# Patient Record
Sex: FEMALE | Race: BLACK OR AFRICAN AMERICAN | NOT HISPANIC OR LATINO | Employment: UNEMPLOYED | URBAN - NONMETROPOLITAN AREA
[De-identification: names, ages, dates, MRNs, and addresses within clinical notes are randomized per-mention and may not be internally consistent; named-entity substitution may affect disease eponyms.]

---

## 2020-12-08 ENCOUNTER — HOSPITAL ENCOUNTER (EMERGENCY)
Facility: HOSPITAL | Age: 40
Discharge: HOME | End: 2020-12-08
Attending: EMERGENCY MEDICINE
Payer: MEDICAID

## 2020-12-08 VITALS
BODY MASS INDEX: 29.03 KG/M2 | HEIGHT: 67 IN | HEART RATE: 94 BPM | WEIGHT: 185 LBS | RESPIRATION RATE: 18 BRPM | TEMPERATURE: 98 F | DIASTOLIC BLOOD PRESSURE: 72 MMHG | OXYGEN SATURATION: 100 % | SYSTOLIC BLOOD PRESSURE: 118 MMHG
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
## 2020-12-08 DIAGNOSIS — T14.90XA INJURY: ICD-10-CM

## 2020-12-08 DIAGNOSIS — S93.602A FOOT SPRAIN, LEFT, INITIAL ENCOUNTER: Primary | ICD-10-CM

## 2020-12-08 PROCEDURE — 25000003 PHARM REV CODE 250: Performed by: NURSE PRACTITIONER

## 2020-12-08 PROCEDURE — 99283 EMERGENCY DEPT VISIT LOW MDM: CPT | Mod: 25

## 2020-12-08 RX ORDER — NAPROXEN 500 MG/1
500 TABLET ORAL 2 TIMES DAILY WITH MEALS
Qty: 10 TABLET | Refills: 0 | Status: SHIPPED | OUTPATIENT
Start: 2020-12-08 | End: 2020-12-13

## 2020-12-08 RX ORDER — PROMETHAZINE HYDROCHLORIDE 25 MG/1
25 TABLET ORAL
Status: COMPLETED | OUTPATIENT
Start: 2020-12-08 | End: 2020-12-08

## 2020-12-08 RX ORDER — HYDROCODONE BITARTRATE AND ACETAMINOPHEN 5; 325 MG/1; MG/1
1 TABLET ORAL
Status: COMPLETED | OUTPATIENT
Start: 2020-12-08 | End: 2020-12-08

## 2020-12-08 RX ADMIN — PROMETHAZINE HYDROCHLORIDE 25 MG: 25 TABLET ORAL at 10:12

## 2020-12-08 RX ADMIN — HYDROCODONE BITARTRATE AND ACETAMINOPHEN 1 TABLET: 5; 325 TABLET ORAL at 10:12

## 2020-12-08 NOTE — ED PROVIDER NOTES
Encounter Date: 12/8/2020       History     Chief Complaint   Patient presents with    Fall     Pt states slipped on mountain dew bottle @ [facility] this morning.  Pt c/o pain to left foot radiating up leg and lower back.     This is a 40-year-old black female with noncontributory past medical history who presents emergency department with complaints of left foot pain after sustaining a fall at [facility] just prior to arrival.  The patient states that she accidentally slipped on a 20 oz mountain dew bottle at [facility] causing her to fall to her left side.  Patient reports constant sharp pain to the left lateral foot area that is worse with ambulation and movement.  Patient also reports mild pain to the bilateral upper and lower back area.  She denies loss of consciousness, head injury, neck pain, loss of control of bladder or bowel, or any other injuries or symptoms at this time.  She denies previous injury to left foot or back.        Review of patient's allergies indicates:  No Known Allergies  History reviewed. No pertinent past medical history.  Past Surgical History:   Procedure Laterality Date    TUBAL LIGATION       History reviewed. No pertinent family history.  Social History     Tobacco Use    Smoking status: Never Smoker   Substance Use Topics    Alcohol use: Never     Frequency: Never    Drug use: Not on file     Review of Systems   Constitutional: Negative.    Respiratory: Negative.    Cardiovascular: Negative.    Genitourinary: Negative.    Musculoskeletal: Positive for arthralgias (Left lateral foot pain), back pain ( upper and lower back) and gait problem ( painful). Negative for neck pain and neck stiffness.   Skin: Negative.    Neurological: Negative for dizziness, tremors, syncope, speech difficulty, weakness, light-headedness, numbness and headaches.   Hematological: Negative.        Physical Exam     Initial Vitals [12/08/20 1036]   BP Pulse Resp Temp SpO2   128/74 93 20 98.1 °F (36.7 °C) 100  %      MAP       --         Physical Exam    Nursing note and vitals reviewed.  Constitutional: She appears well-developed and well-nourished. No distress.   HENT:   Head: Normocephalic and atraumatic.   Mouth/Throat: Oropharynx is clear and moist.   Eyes: Conjunctivae and EOM are normal. Pupils are equal, round, and reactive to light.   Neck: Normal range of motion. Neck supple.   No central vertebral tenderness or pain noted on range of motion   Cardiovascular: Normal rate, regular rhythm, normal heart sounds and intact distal pulses.   Pulmonary/Chest: Breath sounds normal. No respiratory distress.   Musculoskeletal: Normal range of motion. Tenderness present. No edema.      Comments: Back appears normal upon inspection, no rash or bruising noted.  The patient experiences mild tenderness with palpation to Rosendo E thoracic and Rosendo lumbar regions bilaterally, however does not experience pain with palpation of central vertebral prominences.  The left foot appears normal upon inspection, no evidence of ecchymosis or swelling noted, however the patient does experience moderate tenderness with palpation of the region of the 4th and 5th metatarsals.  The left ankle appears normal and is nontender to palpation.   Neurological: She is alert and oriented to person, place, and time. She has normal strength. GCS score is 15. GCS eye subscore is 4. GCS verbal subscore is 5. GCS motor subscore is 6.   Skin: Skin is warm and dry. Capillary refill takes less than 2 seconds.   Psychiatric: She has a normal mood and affect. Thought content normal.         ED Course   Procedures  Labs Reviewed - No data to display       Imaging Results          X-Ray Foot Complete Left (Final result)  Result time 12/08/20 11:25:03    Final result by Isabella Hudson MD (12/08/20 11:25:03)                 Impression:      Negative      Electronically signed by: Isabella Hudson MD  Date:    12/08/2020  Time:    11:25             Narrative:     EXAMINATION:  XR FOOT COMPLETE 3 VIEW LEFT    CLINICAL HISTORY:  Injury, unspecified, initial encounter    FINDINGS:  No evidence of a fracture or dislocation.                                 Medical Decision Making:   Differential Diagnosis:   Left foot sprain  Left foot fracture    Low back strain  Lumbar fracture                   ED Course as of Dec 08 1215   Tue Dec 08, 2020   1211 No fracture noted on foot x-ray    [CB]      ED Course User Index  [CB] Debra Alvarado NP            Clinical Impression:       ICD-10-CM ICD-9-CM   1. Foot sprain, left, initial encounter  S93.602A 845.10   2. Injury  T14.90XA 959.9   3. Fall, initial encounter  W19.XXXA E888.9                          ED Disposition Condition    Discharge Stable        ED Prescriptions     Medication Sig Dispense Start Date End Date Auth. Provider    naproxen (NAPROSYN) 500 MG tablet Take 1 tablet (500 mg total) by mouth 2 (two) times daily with meals. for 5 days 10 tablet 12/8/2020 12/13/2020 Debra Alvarado NP        Follow-up Information     Follow up With Specialties Details Why Contact Info    PCP Follow UP  Call in 2 days for follow-up, for re-evaluation of today's complaint                                        Debra Alvarado NP  12/08/20 1216

## 2020-12-08 NOTE — DISCHARGE INSTRUCTIONS
It is important that you rest your left foot and apply ice to help augment pain relief.  You may also take Tylenol in addition to prescribed medication to help with pain relief.  You should follow-up with your primary doctor or an orthopedist if pain continues or worsens.

## 2021-01-06 ENCOUNTER — HOSPITAL ENCOUNTER (EMERGENCY)
Facility: HOSPITAL | Age: 41
Discharge: HOME | End: 2021-01-06
Attending: EMERGENCY MEDICINE
Payer: MEDICAID

## 2021-01-06 VITALS
TEMPERATURE: 99 F | OXYGEN SATURATION: 100 % | HEIGHT: 67 IN | BODY MASS INDEX: 28.91 KG/M2 | HEART RATE: 81 BPM | WEIGHT: 184.19 LBS | SYSTOLIC BLOOD PRESSURE: 127 MMHG | DIASTOLIC BLOOD PRESSURE: 79 MMHG | RESPIRATION RATE: 18 BRPM

## 2021-01-06 DIAGNOSIS — Z20.822 COVID-19 VIRUS NOT DETECTED: Primary | ICD-10-CM

## 2021-01-06 LAB
CTP QC/QA: YES
SARS-COV-2 RDRP RESP QL NAA+PROBE: NEGATIVE

## 2021-01-06 PROCEDURE — 99283 EMERGENCY DEPT VISIT LOW MDM: CPT

## 2021-01-06 PROCEDURE — U0002 COVID-19 LAB TEST NON-CDC: HCPCS | Performed by: EMERGENCY MEDICINE

## 2021-04-28 ENCOUNTER — HOSPITAL ENCOUNTER (EMERGENCY)
Facility: HOSPITAL | Age: 41
Discharge: HOME | End: 2021-04-28
Attending: EMERGENCY MEDICINE
Payer: MEDICAID

## 2021-04-28 VITALS
HEART RATE: 78 BPM | TEMPERATURE: 99 F | DIASTOLIC BLOOD PRESSURE: 95 MMHG | BODY MASS INDEX: 27.94 KG/M2 | RESPIRATION RATE: 18 BRPM | HEIGHT: 67 IN | SYSTOLIC BLOOD PRESSURE: 141 MMHG | OXYGEN SATURATION: 99 % | WEIGHT: 178 LBS

## 2021-04-28 DIAGNOSIS — R19.7 NAUSEA, VOMITING, AND DIARRHEA: ICD-10-CM

## 2021-04-28 DIAGNOSIS — R53.1 WEAKNESS: Primary | ICD-10-CM

## 2021-04-28 DIAGNOSIS — R11.2 NAUSEA, VOMITING, AND DIARRHEA: ICD-10-CM

## 2021-04-28 LAB
ALBUMIN SERPL BCP-MCNC: 4.5 G/DL (ref 3.5–5.2)
ALP SERPL-CCNC: 80 U/L (ref 55–135)
ALT SERPL W/O P-5'-P-CCNC: 13 U/L (ref 10–44)
ANION GAP SERPL CALC-SCNC: 6 MMOL/L (ref 8–16)
AST SERPL-CCNC: 14 U/L (ref 10–40)
B-HCG UR QL: NEGATIVE
BACTERIA #/AREA URNS HPF: NEGATIVE /HPF
BASOPHILS # BLD AUTO: 0.05 K/UL (ref 0–0.2)
BASOPHILS NFR BLD: 0.5 % (ref 0–1.9)
BILIRUB SERPL-MCNC: 0.5 MG/DL (ref 0.1–1)
BILIRUB UR QL STRIP: NEGATIVE
BUN SERPL-MCNC: 10 MG/DL (ref 6–20)
CALCIUM SERPL-MCNC: 9.7 MG/DL (ref 8.7–10.5)
CHLORIDE SERPL-SCNC: 109 MMOL/L (ref 95–110)
CLARITY UR: ABNORMAL
CO2 SERPL-SCNC: 26 MMOL/L (ref 23–29)
COLOR UR: YELLOW
CREAT SERPL-MCNC: 1.1 MG/DL (ref 0.5–1.4)
DIFFERENTIAL METHOD: ABNORMAL
EOSINOPHIL # BLD AUTO: 0 K/UL (ref 0–0.5)
EOSINOPHIL NFR BLD: 0.4 % (ref 0–8)
ERYTHROCYTE [DISTWIDTH] IN BLOOD BY AUTOMATED COUNT: 17.8 % (ref 11.5–14.5)
EST. GFR  (AFRICAN AMERICAN): >60 ML/MIN/1.73 M^2
EST. GFR  (NON AFRICAN AMERICAN): >60 ML/MIN/1.73 M^2
GLUCOSE SERPL-MCNC: 113 MG/DL (ref 70–110)
GLUCOSE UR QL STRIP: NEGATIVE
HCT VFR BLD AUTO: 39.6 % (ref 37–48.5)
HGB BLD-MCNC: 13 G/DL (ref 12–16)
HGB UR QL STRIP: ABNORMAL
HYALINE CASTS #/AREA URNS LPF: 5 /LPF
IMM GRANULOCYTES # BLD AUTO: 0.03 K/UL (ref 0–0.04)
IMM GRANULOCYTES NFR BLD AUTO: 0.3 % (ref 0–0.5)
KETONES UR QL STRIP: ABNORMAL
LEUKOCYTE ESTERASE UR QL STRIP: NEGATIVE
LYMPHOCYTES # BLD AUTO: 2.2 K/UL (ref 1–4.8)
LYMPHOCYTES NFR BLD: 22.4 % (ref 18–48)
MCH RBC QN AUTO: 26.1 PG (ref 27–31)
MCHC RBC AUTO-ENTMCNC: 32.8 G/DL (ref 32–36)
MCV RBC AUTO: 79 FL (ref 82–98)
MICROSCOPIC COMMENT: ABNORMAL
MONOCYTES # BLD AUTO: 0.6 K/UL (ref 0.3–1)
MONOCYTES NFR BLD: 6.1 % (ref 4–15)
NEUTROPHILS # BLD AUTO: 7 K/UL (ref 1.8–7.7)
NEUTROPHILS NFR BLD: 70.3 % (ref 38–73)
NITRITE UR QL STRIP: NEGATIVE
NRBC BLD-RTO: 0 /100 WBC
PH UR STRIP: 6 [PH] (ref 5–8)
PLATELET # BLD AUTO: 311 K/UL (ref 150–450)
PMV BLD AUTO: 10.5 FL (ref 9.2–12.9)
POTASSIUM SERPL-SCNC: 3.6 MMOL/L (ref 3.5–5.1)
PROT SERPL-MCNC: 9.1 G/DL (ref 6–8.4)
PROT UR QL STRIP: ABNORMAL
RBC # BLD AUTO: 4.99 M/UL (ref 4–5.4)
RBC #/AREA URNS HPF: 8 /HPF (ref 0–4)
SODIUM SERPL-SCNC: 141 MMOL/L (ref 136–145)
SP GR UR STRIP: 1.02 (ref 1–1.03)
SQUAMOUS #/AREA URNS HPF: 8 /HPF
URN SPEC COLLECT METH UR: ABNORMAL
UROBILINOGEN UR STRIP-ACNC: 1 EU/DL
WBC # BLD AUTO: 9.98 K/UL (ref 3.9–12.7)
WBC #/AREA URNS HPF: 2 /HPF (ref 0–5)

## 2021-04-28 PROCEDURE — 81000 URINALYSIS NONAUTO W/SCOPE: CPT | Performed by: NURSE PRACTITIONER

## 2021-04-28 PROCEDURE — 85025 COMPLETE CBC W/AUTO DIFF WBC: CPT | Performed by: NURSE PRACTITIONER

## 2021-04-28 PROCEDURE — 80053 COMPREHEN METABOLIC PANEL: CPT | Performed by: NURSE PRACTITIONER

## 2021-04-28 PROCEDURE — 63600175 PHARM REV CODE 636 W HCPCS: Performed by: NURSE PRACTITIONER

## 2021-04-28 PROCEDURE — 36415 COLL VENOUS BLD VENIPUNCTURE: CPT | Performed by: NURSE PRACTITIONER

## 2021-04-28 PROCEDURE — 25000003 PHARM REV CODE 250: Performed by: NURSE PRACTITIONER

## 2021-04-28 PROCEDURE — 81025 URINE PREGNANCY TEST: CPT | Performed by: NURSE PRACTITIONER

## 2021-04-28 PROCEDURE — 96375 TX/PRO/DX INJ NEW DRUG ADDON: CPT

## 2021-04-28 PROCEDURE — 99284 EMERGENCY DEPT VISIT MOD MDM: CPT | Mod: 25

## 2021-04-28 PROCEDURE — 96365 THER/PROPH/DIAG IV INF INIT: CPT

## 2021-04-28 RX ORDER — PROMETHAZINE HYDROCHLORIDE 25 MG/1
25 TABLET ORAL EVERY 6 HOURS PRN
Qty: 15 TABLET | Refills: 0 | Status: SHIPPED | OUTPATIENT
Start: 2021-04-28 | End: 2021-12-22

## 2021-04-28 RX ORDER — DEXAMETHASONE SODIUM PHOSPHATE 4 MG/ML
4 INJECTION, SOLUTION INTRA-ARTICULAR; INTRALESIONAL; INTRAMUSCULAR; INTRAVENOUS; SOFT TISSUE
Status: COMPLETED | OUTPATIENT
Start: 2021-04-28 | End: 2021-04-28

## 2021-04-28 RX ADMIN — SODIUM CHLORIDE 1000 ML: 0.9 INJECTION, SOLUTION INTRAVENOUS at 10:04

## 2021-04-28 RX ADMIN — PROMETHAZINE HYDROCHLORIDE 25 MG: 25 INJECTION INTRAMUSCULAR; INTRAVENOUS at 10:04

## 2021-04-28 RX ADMIN — DEXAMETHASONE SODIUM PHOSPHATE 4 MG: 4 INJECTION INTRA-ARTICULAR; INTRALESIONAL; INTRAMUSCULAR; INTRAVENOUS; SOFT TISSUE at 11:04

## 2021-12-22 ENCOUNTER — OFFICE VISIT (OUTPATIENT)
Dept: OBSTETRICS AND GYNECOLOGY | Facility: CLINIC | Age: 41
End: 2021-12-22
Payer: MEDICAID

## 2021-12-22 VITALS
SYSTOLIC BLOOD PRESSURE: 135 MMHG | HEIGHT: 67 IN | DIASTOLIC BLOOD PRESSURE: 85 MMHG | HEART RATE: 98 BPM | WEIGHT: 188 LBS | BODY MASS INDEX: 29.51 KG/M2

## 2021-12-22 DIAGNOSIS — N92.1 MENORRHAGIA WITH IRREGULAR CYCLE: ICD-10-CM

## 2021-12-22 DIAGNOSIS — Z30.013 ENCOUNTER FOR INITIAL PRESCRIPTION OF INJECTABLE CONTRACEPTIVE: ICD-10-CM

## 2021-12-22 DIAGNOSIS — Z12.4 SCREENING FOR MALIGNANT NEOPLASM OF THE CERVIX: Primary | ICD-10-CM

## 2021-12-22 DIAGNOSIS — Z01.419 ENCOUNTER FOR ANNUAL ROUTINE GYNECOLOGICAL EXAMINATION: ICD-10-CM

## 2021-12-22 PROCEDURE — 99999 PR PBB SHADOW E&M-EST. PATIENT-LVL II: CPT | Mod: PBBFAC,,, | Performed by: OBSTETRICS & GYNECOLOGY

## 2021-12-22 PROCEDURE — 3079F DIAST BP 80-89 MM HG: CPT | Mod: CPTII,,, | Performed by: OBSTETRICS & GYNECOLOGY

## 2021-12-22 PROCEDURE — 99385 PR PREVENTIVE VISIT,NEW,18-39: ICD-10-PCS | Mod: S$PBB,,, | Performed by: OBSTETRICS & GYNECOLOGY

## 2021-12-22 PROCEDURE — 99385 PREV VISIT NEW AGE 18-39: CPT | Mod: S$PBB,,, | Performed by: OBSTETRICS & GYNECOLOGY

## 2021-12-22 PROCEDURE — 99999 PR PBB SHADOW E&M-EST. PATIENT-LVL II: ICD-10-PCS | Mod: PBBFAC,,, | Performed by: OBSTETRICS & GYNECOLOGY

## 2021-12-22 PROCEDURE — 3008F PR BODY MASS INDEX (BMI) DOCUMENTED: ICD-10-PCS | Mod: CPTII,,, | Performed by: OBSTETRICS & GYNECOLOGY

## 2021-12-22 PROCEDURE — 1159F PR MEDICATION LIST DOCUMENTED IN MEDICAL RECORD: ICD-10-PCS | Mod: CPTII,,, | Performed by: OBSTETRICS & GYNECOLOGY

## 2021-12-22 PROCEDURE — 1159F MED LIST DOCD IN RCRD: CPT | Mod: CPTII,,, | Performed by: OBSTETRICS & GYNECOLOGY

## 2021-12-22 PROCEDURE — 99212 OFFICE O/P EST SF 10 MIN: CPT | Mod: PBBFAC | Performed by: OBSTETRICS & GYNECOLOGY

## 2021-12-22 PROCEDURE — 3008F BODY MASS INDEX DOCD: CPT | Mod: CPTII,,, | Performed by: OBSTETRICS & GYNECOLOGY

## 2021-12-22 PROCEDURE — 3075F SYST BP GE 130 - 139MM HG: CPT | Mod: CPTII,,, | Performed by: OBSTETRICS & GYNECOLOGY

## 2021-12-22 PROCEDURE — 3079F PR MOST RECENT DIASTOLIC BLOOD PRESSURE 80-89 MM HG: ICD-10-PCS | Mod: CPTII,,, | Performed by: OBSTETRICS & GYNECOLOGY

## 2021-12-22 PROCEDURE — 3075F PR MOST RECENT SYSTOLIC BLOOD PRESS GE 130-139MM HG: ICD-10-PCS | Mod: CPTII,,, | Performed by: OBSTETRICS & GYNECOLOGY

## 2021-12-22 RX ORDER — NORELGESTROMIN AND ETHINYL ESTRADIOL 35; 150 UG/MG; UG/MG
PATCH TRANSDERMAL
Qty: 3 PATCH | Refills: 6 | OUTPATIENT
Start: 2021-12-22 | End: 2022-01-03

## 2021-12-28 ENCOUNTER — CLINICAL SUPPORT (OUTPATIENT)
Dept: OBSTETRICS AND GYNECOLOGY | Facility: CLINIC | Age: 41
End: 2021-12-28
Payer: MEDICAID

## 2021-12-28 DIAGNOSIS — Z32.02 NEGATIVE PREGNANCY TEST: ICD-10-CM

## 2021-12-28 DIAGNOSIS — Z30.42 ENCOUNTER FOR DEPO-PROVERA CONTRACEPTION: Primary | ICD-10-CM

## 2021-12-28 LAB
B-HCG UR QL: NEGATIVE
CTP QC/QA: YES

## 2021-12-28 PROCEDURE — 96372 THER/PROPH/DIAG INJ SC/IM: CPT | Mod: PBBFAC

## 2021-12-28 PROCEDURE — 81025 URINE PREGNANCY TEST: CPT | Mod: PBBFAC

## 2021-12-28 RX ORDER — MEDROXYPROGESTERONE ACETATE 150 MG/ML
150 INJECTION, SUSPENSION INTRAMUSCULAR
Status: COMPLETED | OUTPATIENT
Start: 2021-12-28 | End: 2021-12-28

## 2021-12-28 RX ADMIN — MEDROXYPROGESTERONE ACETATE 150 MG: 150 INJECTION, SUSPENSION, EXTENDED RELEASE INTRAMUSCULAR at 08:12

## 2022-01-03 ENCOUNTER — HOSPITAL ENCOUNTER (EMERGENCY)
Facility: HOSPITAL | Age: 42
Discharge: HOME | End: 2022-01-03
Attending: EMERGENCY MEDICINE
Payer: MEDICAID

## 2022-01-03 VITALS
TEMPERATURE: 100 F | OXYGEN SATURATION: 99 % | RESPIRATION RATE: 16 BRPM | DIASTOLIC BLOOD PRESSURE: 92 MMHG | HEIGHT: 67 IN | WEIGHT: 180 LBS | SYSTOLIC BLOOD PRESSURE: 138 MMHG | BODY MASS INDEX: 28.25 KG/M2 | HEART RATE: 91 BPM

## 2022-01-03 DIAGNOSIS — S61.511A WRIST LACERATION, RIGHT, INITIAL ENCOUNTER: Primary | ICD-10-CM

## 2022-01-03 DIAGNOSIS — S61.519A WRIST LACERATION: ICD-10-CM

## 2022-01-03 LAB
HPV16+18+H RISK 12 DNA CVX-IMP: NORMAL
LIQUID BASED PAP SMEAR, SCREENING: NORMAL

## 2022-01-03 PROCEDURE — 12001 RPR S/N/AX/GEN/TRNK 2.5CM/<: CPT

## 2022-01-03 PROCEDURE — 63600175 PHARM REV CODE 636 W HCPCS: Performed by: EMERGENCY MEDICINE

## 2022-01-03 PROCEDURE — 90471 IMMUNIZATION ADMIN: CPT | Performed by: EMERGENCY MEDICINE

## 2022-01-03 PROCEDURE — 99284 EMERGENCY DEPT VISIT MOD MDM: CPT | Mod: 25

## 2022-01-03 PROCEDURE — 25000003 PHARM REV CODE 250: Performed by: EMERGENCY MEDICINE

## 2022-01-03 PROCEDURE — 90715 TDAP VACCINE 7 YRS/> IM: CPT | Performed by: EMERGENCY MEDICINE

## 2022-01-03 RX ORDER — LIDOCAINE HYDROCHLORIDE 10 MG/ML
1 INJECTION INFILTRATION; PERINEURAL
Status: COMPLETED | OUTPATIENT
Start: 2022-01-03 | End: 2022-01-03

## 2022-01-03 RX ORDER — HYDROCODONE BITARTRATE AND ACETAMINOPHEN 5; 325 MG/1; MG/1
1 TABLET ORAL
Status: COMPLETED | OUTPATIENT
Start: 2022-01-03 | End: 2022-01-03

## 2022-01-03 RX ORDER — IBUPROFEN 600 MG/1
600 TABLET ORAL
Status: COMPLETED | OUTPATIENT
Start: 2022-01-03 | End: 2022-01-03

## 2022-01-03 RX ORDER — ONDANSETRON 4 MG/1
4 TABLET, ORALLY DISINTEGRATING ORAL
Status: COMPLETED | OUTPATIENT
Start: 2022-01-03 | End: 2022-01-03

## 2022-01-03 RX ADMIN — LIDOCAINE HYDROCHLORIDE 1 ML: 10 INJECTION, SOLUTION INFILTRATION; PERINEURAL at 04:01

## 2022-01-03 RX ADMIN — ONDANSETRON 4 MG: 4 TABLET, ORALLY DISINTEGRATING ORAL at 03:01

## 2022-01-03 RX ADMIN — IBUPROFEN 600 MG: 600 TABLET ORAL at 03:01

## 2022-01-03 RX ADMIN — TETANUS TOXOID, REDUCED DIPHTHERIA TOXOID AND ACELLULAR PERTUSSIS VACCINE, ADSORBED 0.5 ML: 5; 2.5; 8; 8; 2.5 SUSPENSION INTRAMUSCULAR at 03:01

## 2022-01-03 RX ADMIN — HYDROCODONE BITARTRATE AND ACETAMINOPHEN 1 TABLET: 5; 325 TABLET ORAL at 03:01

## 2022-01-03 NOTE — ED PROVIDER NOTES
Encounter Date: 1/3/2022       History     Chief Complaint   Patient presents with    Laceration     Pt presented to the ER with a 1/2 in laceration just proximal to her right thumb. Pt stated she was cutting bologna to go in her gumbo and she cut her wrist with a kitchen knife. Bleeding had stopped prior to ER arrival. Pt pain is 10/10 and she stated she has not had a tetanus shot.      Patient is a 41-year-old female no significant past medical history presenting to the emergency department complaining of a stab wound on the dorsal aspect of the right wrist about 2 in proximal to the 1st MCP.    Patient states she was cooking a combo this morning she was chopping her vegetables she accidentally stabbed her wrist as described above there is an approximately 3/4 to 1 cm laceration she has pain with movement of the right thumb.    Bleeding is controlled at this time.    She is complaining of pain with movement of the 1st MCP.    She denies fever chills nausea vomiting diarrhea constipation chest pain shortness of breath headache and blurry vision    Patient is left-hand dominant        Review of patient's allergies indicates:  No Known Allergies  Past Medical History:   Diagnosis Date    COVID-19      Past Surgical History:   Procedure Laterality Date    TUBAL LIGATION       No family history on file.  Social History     Tobacco Use    Smoking status: Never Smoker   Substance Use Topics    Alcohol use: Never     Review of Systems   Constitutional: Negative.    HENT: Negative.    Eyes: Negative.    Respiratory: Negative.    Cardiovascular: Negative.    Gastrointestinal: Negative.    Endocrine: Negative.    Genitourinary: Negative.    Musculoskeletal: Negative.    Skin: Negative.    Allergic/Immunologic: Negative.    Neurological: Negative.    Hematological: Negative.    Psychiatric/Behavioral: Negative.    All other systems reviewed and are negative.      Physical Exam     Initial Vitals [01/03/22 0254]   BP  Pulse Resp Temp SpO2   (!) 138/92 110 18 99.7 °F (37.6 °C) 98 %      MAP       --         Physical Exam    Vitals reviewed.  Constitutional: She appears well-developed and well-nourished. No distress.   HENT:   Head: Normocephalic and atraumatic.   Nose: Nose normal.   Mouth/Throat: Oropharynx is clear and moist.   Eyes: Conjunctivae and EOM are normal. Pupils are equal, round, and reactive to light.   Neck: Neck supple.   Normal range of motion.  Cardiovascular: Normal rate and regular rhythm.   Pulmonary/Chest: Breath sounds normal.   Abdominal: Abdomen is soft. Bowel sounds are normal.   Musculoskeletal:         General: Normal range of motion.      Cervical back: Normal range of motion and neck supple.      Comments: The lateral dorsal aspect of the right wrist approximately 2 in proximal to the 1st MCP there is a 1 cm linear laceration.    Intrinsic muscles of the hand and sensation along all nerve roots are intact.  Patient does have some minor limitation in range of motion at the MCP this is likely secondary pain.     Neurological: She is alert and oriented to person, place, and time. She has normal strength. GCS score is 15. GCS eye subscore is 4. GCS verbal subscore is 5. GCS motor subscore is 6.   Skin: Skin is warm and dry. Capillary refill takes less than 2 seconds.   Psychiatric: She has a normal mood and affect. Thought content normal.         ED Course   Lac Repair    Date/Time: 1/3/2022 4:15 AM  Performed by: Brown Flores MD  Authorized by: Brown Flores MD     Consent:     Consent obtained:  Verbal    Consent given by:  Patient    Risks, benefits, and alternatives were discussed: yes      Risks discussed:  Infection and pain  Universal protocol:     Patient identity confirmed:  Verbally with patient and arm band  Anesthesia:     Anesthesia method:  Local infiltration    Local anesthetic:  Lidocaine 1% w/o epi  Laceration details:     Location:  Hand    Hand location:  R wrist    Length  (cm):  1  Pre-procedure details:     Preparation:  Patient was prepped and draped in usual sterile fashion  Exploration:     Limited defect created (wound extended): no      Hemostasis achieved with:  Direct pressure    Imaging obtained: x-ray      Imaging outcome: foreign body not noted      Wound exploration: wound explored through full range of motion      Wound extent: no nerve damage noted, no tendon damage noted, no underlying fracture noted and no vascular damage noted      Contaminated: no    Treatment:     Area cleansed with:  Chlorhexidine and soap and water    Amount of cleaning:  Standard    Irrigation method:  Syringe    Visualized foreign bodies/material removed: no      Debridement:  None  Skin repair:     Repair method:  Sutures    Suture size:  4-0    Wound skin closure material used: Rapid Vicryl.    Suture technique:  Simple interrupted    Number of sutures:  2  Approximation:     Approximation:  Loose  Repair type:     Repair type:  Simple  Post-procedure details:     Dressing:  Adhesive bandage    Procedure completion:  Tolerated well, no immediate complications      Labs Reviewed - No data to display       Imaging Results          X-Ray Wrist Complete Right (In process)               X-Rays:   Independently Interpreted Readings:   Other Readings:  X-ray right wrist viewed by me no fracture no dislocation no foreign    Medications   Tdap (BOOSTRIX) vaccine injection 0.5 mL (0.5 mLs Intramuscular Given 1/3/22 0320)   HYDROcodone-acetaminophen 5-325 mg per tablet 1 tablet (1 tablet Oral Given 1/3/22 0319)   ondansetron disintegrating tablet 4 mg (4 mg Oral Given 1/3/22 0319)   ibuprofen tablet 600 mg (600 mg Oral Given 1/3/22 0320)   LIDOcaine HCL 10 mg/ml (1%) injection 1 mL (1 mL Other Given 1/3/22 0790)     Medical Decision Making:   Initial Assessment:   41-year-old female presenting to the emergency department complaining of a wound to her right wrist that she self-inflicted accidentally wall  could.  She said it was a slight jab stick injury she reports some tingling at the base of her thumb but she does have full range of motion of her thumb as well as the remainder of her hand no evidence of vascular injury no evidence of loss of sensation.    Wound cleaned by me patient requested sutures after we had a discussion about whether it is leave it open her not to small 4 0 rapid absorbable sutures were placed by me under sterile conditions patient tolerated very.  There was loose approximation as this was a stab.    Discussed with the patient wound care described her to keep the area clean and dry wash with soap and water 2 to 3 times a day use an over-the-counter antibiotic ointment and keep it covered whenever out and about.    Patient voiced understanding of discharge plan return precautions and the need for follow-up                      Clinical Impression:   Final diagnoses:  [S61.519A] Wrist laceration  [S61.511A] Wrist laceration, right, initial encounter (Primary)          ED Disposition Condition    Discharge Stable        ED Prescriptions     None        Follow-up Information     Follow up With Specialties Details Why Contact Info Additional Information    [facility] Emergency Department Emergency Medicine  As needed [address] [place] [id number]  [phone] Floor 1           Brown Flores MD  01/03/22 0434

## 2022-01-26 ENCOUNTER — OFFICE VISIT (OUTPATIENT)
Dept: OBSTETRICS AND GYNECOLOGY | Facility: CLINIC | Age: 42
End: 2022-01-26
Payer: MEDICAID

## 2022-01-26 VITALS
WEIGHT: 178 LBS | DIASTOLIC BLOOD PRESSURE: 91 MMHG | SYSTOLIC BLOOD PRESSURE: 145 MMHG | BODY MASS INDEX: 27.88 KG/M2 | HEART RATE: 93 BPM

## 2022-01-26 DIAGNOSIS — N92.1 MENORRHAGIA WITH IRREGULAR CYCLE: Primary | ICD-10-CM

## 2022-01-26 PROCEDURE — 99999 PR PBB SHADOW E&M-EST. PATIENT-LVL II: ICD-10-PCS | Mod: PBBFAC,,, | Performed by: OBSTETRICS & GYNECOLOGY

## 2022-01-26 PROCEDURE — 3008F PR BODY MASS INDEX (BMI) DOCUMENTED: ICD-10-PCS | Mod: CPTII,,, | Performed by: OBSTETRICS & GYNECOLOGY

## 2022-01-26 PROCEDURE — 99213 OFFICE O/P EST LOW 20 MIN: CPT | Mod: S$PBB,,, | Performed by: OBSTETRICS & GYNECOLOGY

## 2022-01-26 PROCEDURE — 99213 PR OFFICE/OUTPT VISIT, EST, LEVL III, 20-29 MIN: ICD-10-PCS | Mod: S$PBB,,, | Performed by: OBSTETRICS & GYNECOLOGY

## 2022-01-26 PROCEDURE — 99999 PR PBB SHADOW E&M-EST. PATIENT-LVL II: CPT | Mod: PBBFAC,,, | Performed by: OBSTETRICS & GYNECOLOGY

## 2022-01-26 PROCEDURE — 3077F SYST BP >= 140 MM HG: CPT | Mod: CPTII,,, | Performed by: OBSTETRICS & GYNECOLOGY

## 2022-01-26 PROCEDURE — 3080F DIAST BP >= 90 MM HG: CPT | Mod: CPTII,,, | Performed by: OBSTETRICS & GYNECOLOGY

## 2022-01-26 PROCEDURE — 99212 OFFICE O/P EST SF 10 MIN: CPT | Mod: PBBFAC | Performed by: OBSTETRICS & GYNECOLOGY

## 2022-01-26 PROCEDURE — 3077F PR MOST RECENT SYSTOLIC BLOOD PRESSURE >= 140 MM HG: ICD-10-PCS | Mod: CPTII,,, | Performed by: OBSTETRICS & GYNECOLOGY

## 2022-01-26 PROCEDURE — 3008F BODY MASS INDEX DOCD: CPT | Mod: CPTII,,, | Performed by: OBSTETRICS & GYNECOLOGY

## 2022-01-26 PROCEDURE — 3080F PR MOST RECENT DIASTOLIC BLOOD PRESSURE >= 90 MM HG: ICD-10-PCS | Mod: CPTII,,, | Performed by: OBSTETRICS & GYNECOLOGY

## 2022-01-26 NOTE — PROGRESS NOTES
Subjective:    Patient ID: Cliff Matute is a 41 y.o. y.o. female    Chief Complaint:   Chief Complaint   Patient presents with    Follow-up     Having heavy periods, and light spotting in between each. Also having lots of bleeding during sex. Would like to discuss surgery       History of Present Illness:  Cliff presents today for discussion of heavy cycles.  patient began Depo-Provera injections on 12/28/2021 and states that her cycles are still heavy and she wants more definitive management.  She states that she is having bleeding during sex and wants surgery to stop her cycles.  She states she used patches in the past and still had issues.      Review of Systems   Constitutional: Negative for chills, fatigue and fever.   Respiratory: Negative for shortness of breath.    Cardiovascular: Negative for chest pain.   Gastrointestinal: Negative for abdominal pain, constipation, diarrhea and nausea.   Genitourinary: Positive for menstrual problem and vaginal bleeding. Negative for bladder incontinence, dysuria, hot flashes and pelvic pain.   Neurological: Negative for headaches.   Psychiatric/Behavioral: Negative for depression.         Objective:    Vital Signs:  Vitals:    01/26/22 1056   BP: (!) 145/91   Pulse: 93     Wt Readings from Last 1 Encounters:   01/26/22 80.7 kg (178 lb)     Body mass index is 27.88 kg/m².    Physical Exam:  General:  alert, no distress    Exam deferred-discussion only     Discussed that she just recently took her 1st Depo-Provera shot and some of her bleeding could take some time to resolve.  Patient wants more definitive management now.  Options such as progesterone secreting IUD, endometrial ablation, and hysterectomy discussed.  Patient adamantly does not want hysterectomy.  All risks, benefits of each discussed with patient and she would like to have endometrial ablation.  I explained workup involved and she is agreeable to this.  All questions answered    I spent a total of 20  minutes on the day of the visit.  This includes face to face time and non-face to face time preparing to see the patient (eg, review of tests), obtaining and/or reviewing separately obtained history, documenting clinical information in the electronic or other health record, independently interpreting results and communicating results to the patient/family/caregiver, or care coordinator.          Assessment:      1. Menorrhagia with irregular cycle          Plan:      Menorrhagia with irregular cycle    Return to clinic for endometrial biopsy and ultrasound as part of workup for endometrial ablation       Tonia Tabor MD, FACOG   01/26/2022 11:14 AM

## 2022-02-10 ENCOUNTER — PROCEDURE VISIT (OUTPATIENT)
Dept: OBSTETRICS AND GYNECOLOGY | Facility: CLINIC | Age: 42
End: 2022-02-10
Payer: MEDICAID

## 2022-02-10 VITALS
WEIGHT: 181 LBS | DIASTOLIC BLOOD PRESSURE: 87 MMHG | BODY MASS INDEX: 28.35 KG/M2 | SYSTOLIC BLOOD PRESSURE: 135 MMHG | HEART RATE: 87 BPM

## 2022-02-10 DIAGNOSIS — N93.9 VAGINAL BLEEDING: Primary | ICD-10-CM

## 2022-02-10 DIAGNOSIS — N92.1 MENORRHAGIA WITH IRREGULAR CYCLE: Primary | ICD-10-CM

## 2022-02-10 PROCEDURE — 58100 BIOPSY (GYNECOLOGICAL): ICD-10-PCS | Mod: S$PBB,,, | Performed by: OBSTETRICS & GYNECOLOGY

## 2022-02-10 PROCEDURE — 81025 URINE PREGNANCY TEST: CPT | Mod: PBBFAC | Performed by: OBSTETRICS & GYNECOLOGY

## 2022-02-10 PROCEDURE — 58100 BIOPSY OF UTERUS LINING: CPT | Mod: PBBFAC | Performed by: OBSTETRICS & GYNECOLOGY

## 2022-02-10 NOTE — PROCEDURES
Biopsy (Gynecological)    Date/Time: 2/10/2022 11:15 AM  Performed by: Tonia Tabor MD  Authorized by: Tonia Tabor MD     Consent Done?:  Yes (Written)   Patient was prepped and draped in the normal sterile fashion.  Local anesthesia used?: No      Biopsy Location:  Uterus  Estimated blood loss (cc):  1   Patient tolerated the procedure well with no immediate complications.     Cervix swabbed with betadine.  Single-tooth tenaculum used for traction and placed on the anterior lip of the cervix.  Uterus sound to 11 cm an adequate tissue obtained with 1 pass.  Tenaculum site hemostatic with silver nitrate.

## 2022-02-15 DIAGNOSIS — N92.1 MENORRHAGIA WITH IRREGULAR CYCLE: Primary | ICD-10-CM

## 2022-02-15 LAB — TISSUE SPECIMEN TO PATHOLOGY, OB/GYN: NORMAL

## 2022-02-15 NOTE — PROGRESS NOTES
Please call patient regarding results.  Biopsy with no abnormalities--can schedule endometrial ablation

## 2022-03-14 NOTE — DISCHARGE INSTRUCTIONS
BEFORE THE PROCEDURE:    REPORT ANY CHANGE IN YOUR PHYSICAL CONDITION TO YOUR DOCTOR IMMEDIATELY.  SELF ISOLATE AND CHECK TEMPERATURE DAILY, IF TEMP OVER 100, CALL PHYSICIAN IMMEDIATELY.  TRY TO REFRAIN FROM SMOKING AND ALCOHOL 72 HOURS BEFORE YOUR PROCEDURE.   DO NOT EAT OR DRINK ANYTHING AFTER MIDNIGHT THE NIGHT BEFORE YOUR PROCEDURE.  NO MAKE UP, NAIL POLISH OR JEWELRY.      SOMEONE WILL CALL YOU THE DAY BEFORE YOUR PROCEDURE WITH A CHECK-IN TIME FOR YOUR PROCEDURE.    DAY OF YOUR PROCEDURE:    TAKE BLOOD PRESSURE MEDICATIONS THE MORNING OF YOUR PROCEDURE, WITH SMALL SIPS WATER, AS DIRECTED BY YOUR PHYSICIAN.   DO NOT TAKE ANY DIABETIC MEDICATIONS UNLESS DIRECTED TO DO SO BY YOUR PHYSICIAN.   CONTACT LENSES AND DENTURES MUST BE REMOVED.  A RESPONSIBLE ADULT MUST ACCOMPANY YOU HOME UPON DISCHARGE.   ONLY 1 VISITOR ALLOWED PER ROOM.     USE WIPES AS INSTRUCTED.    RETURN TO REGISTRATION ON Friday MARCH 18 BETWEEN 8A-11A FOR A COVID TEST.    YOUR THOUGHTS AND OPINIONS HELP US TO BETTER SERVE YOU.     PLEASE PARTICIPATE IN SURVEYS ABOUT YOUR CARE.    THANK YOU FOR CHOOSING [facility].

## 2022-03-15 ENCOUNTER — HOSPITAL ENCOUNTER (OUTPATIENT)
Dept: PULMONOLOGY | Facility: HOSPITAL | Age: 42
Discharge: HOME | End: 2022-03-15
Attending: OBSTETRICS & GYNECOLOGY
Payer: MEDICAID

## 2022-03-15 ENCOUNTER — HOSPITAL ENCOUNTER (OUTPATIENT)
Dept: PREADMISSION TESTING | Facility: HOSPITAL | Age: 42
Discharge: HOME | End: 2022-03-15
Attending: OBSTETRICS & GYNECOLOGY
Payer: MEDICAID

## 2022-03-15 ENCOUNTER — HOSPITAL ENCOUNTER (OUTPATIENT)
Dept: RADIOLOGY | Facility: HOSPITAL | Age: 42
Discharge: HOME | End: 2022-03-15
Attending: OBSTETRICS & GYNECOLOGY
Payer: MEDICAID

## 2022-03-15 ENCOUNTER — OFFICE VISIT (OUTPATIENT)
Dept: OBSTETRICS AND GYNECOLOGY | Facility: CLINIC | Age: 42
End: 2022-03-15
Payer: MEDICAID

## 2022-03-15 VITALS
DIASTOLIC BLOOD PRESSURE: 99 MMHG | WEIGHT: 187 LBS | BODY MASS INDEX: 29.29 KG/M2 | HEART RATE: 76 BPM | SYSTOLIC BLOOD PRESSURE: 145 MMHG

## 2022-03-15 VITALS — HEIGHT: 67 IN | WEIGHT: 187 LBS | BODY MASS INDEX: 29.35 KG/M2

## 2022-03-15 DIAGNOSIS — N92.1 MENORRHAGIA WITH IRREGULAR CYCLE: Primary | ICD-10-CM

## 2022-03-15 DIAGNOSIS — N92.1 MENORRHAGIA WITH IRREGULAR CYCLE: ICD-10-CM

## 2022-03-15 DIAGNOSIS — N92.0 MENORRHAGIA: ICD-10-CM

## 2022-03-15 DIAGNOSIS — O98.919: Primary | ICD-10-CM

## 2022-03-15 PROCEDURE — 99213 OFFICE O/P EST LOW 20 MIN: CPT | Mod: S$PBB,,, | Performed by: OBSTETRICS & GYNECOLOGY

## 2022-03-15 PROCEDURE — 1160F PR REVIEW ALL MEDS BY PRESCRIBER/CLIN PHARMACIST DOCUMENTED: ICD-10-PCS | Mod: CPTII,,, | Performed by: OBSTETRICS & GYNECOLOGY

## 2022-03-15 PROCEDURE — 3077F PR MOST RECENT SYSTOLIC BLOOD PRESSURE >= 140 MM HG: ICD-10-PCS | Mod: CPTII,,, | Performed by: OBSTETRICS & GYNECOLOGY

## 2022-03-15 PROCEDURE — 93005 ELECTROCARDIOGRAM TRACING: CPT

## 2022-03-15 PROCEDURE — 3080F DIAST BP >= 90 MM HG: CPT | Mod: CPTII,,, | Performed by: OBSTETRICS & GYNECOLOGY

## 2022-03-15 PROCEDURE — 93010 EKG 12-LEAD: ICD-10-PCS | Mod: ,,, | Performed by: INTERNAL MEDICINE

## 2022-03-15 PROCEDURE — 1160F RVW MEDS BY RX/DR IN RCRD: CPT | Mod: CPTII,,, | Performed by: OBSTETRICS & GYNECOLOGY

## 2022-03-15 PROCEDURE — 1159F PR MEDICATION LIST DOCUMENTED IN MEDICAL RECORD: ICD-10-PCS | Mod: CPTII,,, | Performed by: OBSTETRICS & GYNECOLOGY

## 2022-03-15 PROCEDURE — 1159F MED LIST DOCD IN RCRD: CPT | Mod: CPTII,,, | Performed by: OBSTETRICS & GYNECOLOGY

## 2022-03-15 PROCEDURE — 71046 X-RAY EXAM CHEST 2 VIEWS: CPT | Mod: TC

## 2022-03-15 PROCEDURE — 99999 PR PBB SHADOW E&M-EST. PATIENT-LVL II: CPT | Mod: PBBFAC,,, | Performed by: OBSTETRICS & GYNECOLOGY

## 2022-03-15 PROCEDURE — 3008F PR BODY MASS INDEX (BMI) DOCUMENTED: ICD-10-PCS | Mod: CPTII,,, | Performed by: OBSTETRICS & GYNECOLOGY

## 2022-03-15 PROCEDURE — 99212 OFFICE O/P EST SF 10 MIN: CPT | Mod: PBBFAC | Performed by: OBSTETRICS & GYNECOLOGY

## 2022-03-15 PROCEDURE — 99213 PR OFFICE/OUTPT VISIT, EST, LEVL III, 20-29 MIN: ICD-10-PCS | Mod: S$PBB,,, | Performed by: OBSTETRICS & GYNECOLOGY

## 2022-03-15 PROCEDURE — 93010 ELECTROCARDIOGRAM REPORT: CPT | Mod: ,,, | Performed by: INTERNAL MEDICINE

## 2022-03-15 PROCEDURE — 3008F BODY MASS INDEX DOCD: CPT | Mod: CPTII,,, | Performed by: OBSTETRICS & GYNECOLOGY

## 2022-03-15 PROCEDURE — 99999 PR PBB SHADOW E&M-EST. PATIENT-LVL II: ICD-10-PCS | Mod: PBBFAC,,, | Performed by: OBSTETRICS & GYNECOLOGY

## 2022-03-15 PROCEDURE — 3080F PR MOST RECENT DIASTOLIC BLOOD PRESSURE >= 90 MM HG: ICD-10-PCS | Mod: CPTII,,, | Performed by: OBSTETRICS & GYNECOLOGY

## 2022-03-15 PROCEDURE — 3077F SYST BP >= 140 MM HG: CPT | Mod: CPTII,,, | Performed by: OBSTETRICS & GYNECOLOGY

## 2022-03-15 RX ORDER — MUPIROCIN 20 MG/G
OINTMENT TOPICAL
Status: CANCELLED | OUTPATIENT
Start: 2022-03-15

## 2022-03-15 RX ORDER — SODIUM CHLORIDE, SODIUM LACTATE, POTASSIUM CHLORIDE, CALCIUM CHLORIDE 600; 310; 30; 20 MG/100ML; MG/100ML; MG/100ML; MG/100ML
INJECTION, SOLUTION INTRAVENOUS CONTINUOUS
Status: CANCELLED | OUTPATIENT
Start: 2022-03-15

## 2022-03-15 NOTE — PROGRESS NOTES
History & Physical    SUBJECTIVE:     History of Present Illness:  Patient is a 41 y.o. female presents with normal uterine bleeding.  She is here for preop for D&C with hysteroscopy and endometrial ablation. Onset of symptoms was gradual starting several months ago with gradually worsening course since that time.  She desires more definitive management of her symptoms.  Endometrial biopsies within normal limits      Review of patient's allergies indicates:  No Known Allergies    No current outpatient medications on file.     No current facility-administered medications for this visit.       Past Medical History:   Diagnosis Date    COVID-19      Past Surgical History:   Procedure Laterality Date    TUBAL LIGATION       History reviewed. No pertinent family history.  Social History     Tobacco Use    Smoking status: Never Smoker   Substance Use Topics    Alcohol use: Never        Review of Systems:  Review of Systems   Constitutional: Negative for chills and fever.   Respiratory: Negative for cough.    Cardiovascular: Negative for chest pain.   Gastrointestinal: Negative for abdominal pain, constipation, diarrhea, nausea and vomiting.   Genitourinary: Positive for menstrual problem. Negative for dysuria, pelvic pain and vaginal bleeding.       OBJECTIVE:     Vital Signs (Most Recent)  Pulse: 76 (03/15/22 0900)  BP: (!) 145/99 (03/15/22 0900)     84.8 kg (187 lb)     Physical Exam:  Physical Exam  Constitutional:       Appearance: Normal appearance.   Cardiovascular:      Rate and Rhythm: Normal rate and regular rhythm.      Heart sounds: No murmur heard.  Pulmonary:      Effort: Pulmonary effort is normal.      Breath sounds: Normal breath sounds. No wheezing.   Abdominal:      General: Bowel sounds are normal. There is no distension.      Palpations: Abdomen is soft.   Genitourinary:     General: Normal vulva.      Cervix: Normal.      Uterus: Normal.       Adnexa: Right adnexa normal and left adnexa normal.    Musculoskeletal:         General: Normal range of motion.      Right lower leg: No edema.      Left lower leg: No edema.   Neurological:      Mental Status: She is alert.         Laboratory  Ordered    Diagnostic Results:  Ordered     Procedure explained including all risks, benefits, alternatives and patient desires to proceed.  All questions answered and consent obtained.    ASSESSMENT/PLAN:     Menorrhagia with irregular cycle    PLAN:Plan     D&C with hysteroscopy and endometrial ablation

## 2022-03-16 ENCOUNTER — ANESTHESIA EVENT (OUTPATIENT)
Dept: SURGERY | Facility: HOSPITAL | Age: 42
End: 2022-03-16
Payer: MEDICAID

## 2022-03-16 NOTE — ANESTHESIA PREPROCEDURE EVALUATION
03/16/2022  Cliff Matute is a 41 y.o., female.      Pre-op Assessment    I have reviewed the Patient Summary Reports.    I have reviewed the NPO Status.   I have reviewed the Medications.     Review of Systems  Anesthesia Hx:  No problems with previous Anesthesia  Denies Family Hx of Anesthesia complications.   Denies Personal Hx of Anesthesia complications.   Social:  Non-Smoker    Cardiovascular:  Cardiovascular Normal  ECG has been reviewed.    Pulmonary:  Pulmonary Normal HX COVID   Renal/:  Renal/ Normal     Hepatic/GI:  Hepatic/GI Normal    Neurological:  Neurology Normal    Endocrine:  Endocrine Normal      Lab Results   Component Value Date    WBC 8.10 03/19/2022    HGB 10.4 (L) 03/19/2022    HCT 32.6 (L) 03/19/2022    MCV 78 (L) 03/19/2022     03/19/2022       Lab Results   Component Value Date    WBC 9.98 04/28/2021    HGB 13.0 04/28/2021    HCT 39.6 04/28/2021    MCV 79 (L) 04/28/2021     04/28/2021         Physical Exam  General: Well nourished    Airway:  Mallampati: II / I  Mouth Opening: Normal  TM Distance: Normal  Tongue: Normal  Neck ROM: Normal ROM    Chest/Lungs:  Clear to auscultation    Heart:  Rate: Normal  Rhythm: Regular Rhythm  Sounds: Normal        Anesthesia Plan  Type of Anesthesia, risks & benefits discussed:    Anesthesia Type: MAC, Gen Supraglottic Airway  Intra-op Monitoring Plan: Standard ASA Monitors  Post Op Pain Control Plan: multimodal analgesia  Induction:  IV  Airway Plan: Direct  Informed Consent: Informed consent signed with the Patient and all parties understand the risks and agree with anesthesia plan.  All questions answered.   ASA Score: 2  Day of Surgery Review of History & Physical: I have interviewed and examined the patient. I have reviewed the patient's H&P dated: There are no significant changes.     Ready For Surgery From Anesthesia  Perspective.     .

## 2022-03-21 ENCOUNTER — HOSPITAL ENCOUNTER (OUTPATIENT)
Facility: HOSPITAL | Age: 42
Discharge: HOME | End: 2022-03-21
Attending: OBSTETRICS & GYNECOLOGY | Admitting: OBSTETRICS & GYNECOLOGY
Payer: MEDICAID

## 2022-03-21 ENCOUNTER — ANESTHESIA (OUTPATIENT)
Dept: SURGERY | Facility: HOSPITAL | Age: 42
End: 2022-03-21
Payer: MEDICAID

## 2022-03-21 VITALS
OXYGEN SATURATION: 95 % | DIASTOLIC BLOOD PRESSURE: 70 MMHG | TEMPERATURE: 99 F | HEART RATE: 85 BPM | RESPIRATION RATE: 18 BRPM | SYSTOLIC BLOOD PRESSURE: 127 MMHG

## 2022-03-21 DIAGNOSIS — N92.0 MENORRHAGIA: ICD-10-CM

## 2022-03-21 DIAGNOSIS — N92.1 MENORRHAGIA WITH IRREGULAR CYCLE: ICD-10-CM

## 2022-03-21 LAB
B-HCG UR QL: NEGATIVE
CTP QC/QA: YES
SARS-COV-2 AG RESP QL IA.RAPID: NEGATIVE

## 2022-03-21 PROCEDURE — 71000016 HC POSTOP RECOV ADDL HR: Performed by: OBSTETRICS & GYNECOLOGY

## 2022-03-21 PROCEDURE — 63600175 PHARM REV CODE 636 W HCPCS: Performed by: OBSTETRICS & GYNECOLOGY

## 2022-03-21 PROCEDURE — 37000009 HC ANESTHESIA EA ADD 15 MINS: Performed by: OBSTETRICS & GYNECOLOGY

## 2022-03-21 PROCEDURE — 58563 HYSTEROSCOPY ABLATION: CPT | Mod: ,,, | Performed by: OBSTETRICS & GYNECOLOGY

## 2022-03-21 PROCEDURE — 63600175 PHARM REV CODE 636 W HCPCS: Performed by: NURSE ANESTHETIST, CERTIFIED REGISTERED

## 2022-03-21 PROCEDURE — 00952 ANES VAG PX HYSTSC&/HSG: CPT | Performed by: OBSTETRICS & GYNECOLOGY

## 2022-03-21 PROCEDURE — 71000015 HC POSTOP RECOV 1ST HR: Performed by: OBSTETRICS & GYNECOLOGY

## 2022-03-21 PROCEDURE — 36000706: Performed by: OBSTETRICS & GYNECOLOGY

## 2022-03-21 PROCEDURE — 27201423 OPTIME MED/SURG SUP & DEVICES STERILE SUPPLY: Performed by: OBSTETRICS & GYNECOLOGY

## 2022-03-21 PROCEDURE — 71000033 HC RECOVERY, INTIAL HOUR: Performed by: OBSTETRICS & GYNECOLOGY

## 2022-03-21 PROCEDURE — 81025 URINE PREGNANCY TEST: CPT | Performed by: ANESTHESIOLOGY

## 2022-03-21 PROCEDURE — 36000707: Performed by: OBSTETRICS & GYNECOLOGY

## 2022-03-21 PROCEDURE — 58563 PR HYSTEROSCOPY,W/ENDOMETRIAL ABLATION: ICD-10-PCS | Mod: ,,, | Performed by: OBSTETRICS & GYNECOLOGY

## 2022-03-21 PROCEDURE — 25000003 PHARM REV CODE 250: Performed by: NURSE ANESTHETIST, CERTIFIED REGISTERED

## 2022-03-21 PROCEDURE — 37000008 HC ANESTHESIA 1ST 15 MINUTES: Performed by: OBSTETRICS & GYNECOLOGY

## 2022-03-21 PROCEDURE — 25000003 PHARM REV CODE 250: Performed by: OBSTETRICS & GYNECOLOGY

## 2022-03-21 RX ORDER — SODIUM CHLORIDE 9 MG/ML
INJECTION, SOLUTION INTRAVENOUS CONTINUOUS
Status: DISCONTINUED | OUTPATIENT
Start: 2022-03-21 | End: 2022-03-21 | Stop reason: HOSPADM

## 2022-03-21 RX ORDER — MORPHINE SULFATE 4 MG/ML
4 INJECTION, SOLUTION INTRAMUSCULAR; INTRAVENOUS EVERY 5 MIN PRN
Status: DISCONTINUED | OUTPATIENT
Start: 2022-03-21 | End: 2022-03-21 | Stop reason: HOSPADM

## 2022-03-21 RX ORDER — PROPOFOL 10 MG/ML
INJECTION, EMULSION INTRAVENOUS
Status: DISCONTINUED | OUTPATIENT
Start: 2022-03-21 | End: 2022-03-21

## 2022-03-21 RX ORDER — FENTANYL CITRATE 50 UG/ML
INJECTION, SOLUTION INTRAMUSCULAR; INTRAVENOUS
Status: DISCONTINUED | OUTPATIENT
Start: 2022-03-21 | End: 2022-03-21

## 2022-03-21 RX ORDER — IBUPROFEN 600 MG/1
600 TABLET ORAL 3 TIMES DAILY
Qty: 60 TABLET | Refills: 1 | Status: SHIPPED | OUTPATIENT
Start: 2022-03-21 | End: 2023-05-31

## 2022-03-21 RX ORDER — DIPHENHYDRAMINE HCL 25 MG
25 CAPSULE ORAL EVERY 4 HOURS PRN
Status: DISCONTINUED | OUTPATIENT
Start: 2022-03-21 | End: 2022-03-21 | Stop reason: HOSPADM

## 2022-03-21 RX ORDER — MUPIROCIN 20 MG/G
OINTMENT TOPICAL
Status: DISCONTINUED | OUTPATIENT
Start: 2022-03-21 | End: 2022-03-21

## 2022-03-21 RX ORDER — HYDROMORPHONE HYDROCHLORIDE 1 MG/ML
0.5 INJECTION, SOLUTION INTRAMUSCULAR; INTRAVENOUS; SUBCUTANEOUS EVERY 5 MIN PRN
Status: DISCONTINUED | OUTPATIENT
Start: 2022-03-21 | End: 2022-03-21 | Stop reason: HOSPADM

## 2022-03-21 RX ORDER — SILVER NITRATE 38.21; 12.74 MG/1; MG/1
STICK TOPICAL
Status: DISCONTINUED | OUTPATIENT
Start: 2022-03-21 | End: 2022-03-21 | Stop reason: HOSPADM

## 2022-03-21 RX ORDER — LIDOCAINE HYDROCHLORIDE 10 MG/ML
INJECTION, SOLUTION EPIDURAL; INFILTRATION; INTRACAUDAL; PERINEURAL
Status: DISCONTINUED | OUTPATIENT
Start: 2022-03-21 | End: 2022-03-21

## 2022-03-21 RX ORDER — ONDANSETRON 2 MG/ML
INJECTION INTRAMUSCULAR; INTRAVENOUS
Status: DISCONTINUED | OUTPATIENT
Start: 2022-03-21 | End: 2022-03-21

## 2022-03-21 RX ORDER — HYDROCODONE BITARTRATE AND ACETAMINOPHEN 5; 325 MG/1; MG/1
1 TABLET ORAL EVERY 4 HOURS PRN
Status: DISCONTINUED | OUTPATIENT
Start: 2022-03-21 | End: 2022-03-21 | Stop reason: HOSPADM

## 2022-03-21 RX ORDER — PROCHLORPERAZINE EDISYLATE 5 MG/ML
5 INJECTION INTRAMUSCULAR; INTRAVENOUS EVERY 6 HOURS PRN
Status: DISCONTINUED | OUTPATIENT
Start: 2022-03-21 | End: 2022-03-21 | Stop reason: HOSPADM

## 2022-03-21 RX ORDER — SODIUM CHLORIDE, SODIUM LACTATE, POTASSIUM CHLORIDE, CALCIUM CHLORIDE 600; 310; 30; 20 MG/100ML; MG/100ML; MG/100ML; MG/100ML
INJECTION, SOLUTION INTRAVENOUS CONTINUOUS
Status: DISCONTINUED | OUTPATIENT
Start: 2022-03-21 | End: 2022-03-21

## 2022-03-21 RX ORDER — ONDANSETRON 2 MG/ML
4 INJECTION INTRAMUSCULAR; INTRAVENOUS DAILY PRN
Status: DISCONTINUED | OUTPATIENT
Start: 2022-03-21 | End: 2022-03-21 | Stop reason: HOSPADM

## 2022-03-21 RX ORDER — HYDROCODONE BITARTRATE AND ACETAMINOPHEN 5; 325 MG/1; MG/1
1 TABLET ORAL EVERY 6 HOURS PRN
Qty: 5 TABLET | Refills: 0 | Status: SHIPPED | OUTPATIENT
Start: 2022-03-21

## 2022-03-21 RX ORDER — MEDROXYPROGESTERONE ACETATE 150 MG/ML
150 INJECTION, SUSPENSION INTRAMUSCULAR ONCE
Status: COMPLETED | OUTPATIENT
Start: 2022-03-21 | End: 2022-03-21

## 2022-03-21 RX ORDER — DIPHENHYDRAMINE HYDROCHLORIDE 50 MG/ML
25 INJECTION INTRAMUSCULAR; INTRAVENOUS EVERY 6 HOURS PRN
Status: DISCONTINUED | OUTPATIENT
Start: 2022-03-21 | End: 2022-03-21 | Stop reason: HOSPADM

## 2022-03-21 RX ORDER — MIDAZOLAM HYDROCHLORIDE 1 MG/ML
INJECTION INTRAMUSCULAR; INTRAVENOUS
Status: DISCONTINUED | OUTPATIENT
Start: 2022-03-21 | End: 2022-03-21

## 2022-03-21 RX ORDER — DIPHENHYDRAMINE HYDROCHLORIDE 50 MG/ML
25 INJECTION INTRAMUSCULAR; INTRAVENOUS EVERY 4 HOURS PRN
Status: DISCONTINUED | OUTPATIENT
Start: 2022-03-21 | End: 2022-03-21 | Stop reason: HOSPADM

## 2022-03-21 RX ORDER — ONDANSETRON 4 MG/1
8 TABLET, ORALLY DISINTEGRATING ORAL EVERY 8 HOURS PRN
Status: DISCONTINUED | OUTPATIENT
Start: 2022-03-21 | End: 2022-03-21 | Stop reason: HOSPADM

## 2022-03-21 RX ADMIN — MUPIROCIN: 20 OINTMENT TOPICAL at 07:03

## 2022-03-21 RX ADMIN — ONDANSETRON 8 MG: 4 TABLET, ORALLY DISINTEGRATING ORAL at 11:03

## 2022-03-21 RX ADMIN — FENTANYL CITRATE 50 MCG: 50 INJECTION INTRAMUSCULAR; INTRAVENOUS at 09:03

## 2022-03-21 RX ADMIN — LIDOCAINE HYDROCHLORIDE 50 MG: 10 INJECTION, SOLUTION EPIDURAL; INFILTRATION; INTRACAUDAL at 09:03

## 2022-03-21 RX ADMIN — PROPOFOL 160 MG: 10 INJECTION, EMULSION INTRAVENOUS at 09:03

## 2022-03-21 RX ADMIN — DOXYCYCLINE 200 MG: 100 INJECTION, POWDER, LYOPHILIZED, FOR SOLUTION INTRAVENOUS at 08:03

## 2022-03-21 RX ADMIN — ONDANSETRON 4 MG: 2 INJECTION INTRAMUSCULAR; INTRAVENOUS at 09:03

## 2022-03-21 RX ADMIN — MEDROXYPROGESTERONE ACETATE 150 MG: 150 INJECTION, SUSPENSION, EXTENDED RELEASE INTRAMUSCULAR at 12:03

## 2022-03-21 RX ADMIN — MIDAZOLAM 2 MG: 1 INJECTION INTRAMUSCULAR; INTRAVENOUS at 09:03

## 2022-03-21 RX ADMIN — HYDROCODONE BITARTRATE AND ACETAMINOPHEN 1 TABLET: 5; 325 TABLET ORAL at 11:03

## 2022-03-21 RX ADMIN — SODIUM CHLORIDE, SODIUM LACTATE, POTASSIUM CHLORIDE, AND CALCIUM CHLORIDE: .6; .31; .03; .02 INJECTION, SOLUTION INTRAVENOUS at 07:03

## 2022-03-21 NOTE — OP NOTE
Surgery Date: 3/21/2022     Surgeon(s) and Role:     * Tonia Tabor MD - Primary    Pre-op Diagnosis:    Menorrhagia with irregular cycle [N92.1]    Post-op Diagnosis:    Menorrhagia with irregular cycle [N92.1]    Procedure(s):  Procedure(s):  HYSTEROSCOPY, WITH DILATION AND CURETTAGE OF UTERUS AND HYDROTHERMAL ENDOMETRIAL ABLATION    Anesthesia: general LMA    EBL: 10 mL  IV fluids: 1000 mL  UOP: 150 mL  Distension medium: 100mL in/100 mL out    PROCEDURE IN DETAIL:  The patient was placed on the operating table in the dorsal supine position. She was given Propofol intravenously per anesthesia and then placed into the lithotomy position.  The vagina and perineum were prepped with betadine solution. She was then draped in the usual manner for a vaginal procedure.  After assuring adequate anesthesia, a Dorsey catheter was inserted into the bladder and the bladder drained. A weighted speculum was placed in the vagina and the cervix was identified. The anterior lip was grasped with a single-tooth tenaculum and used for traction.  The cervical canal was then sounded for length. The cervical length was 4.0 cm. Next, the uterus was sounded for uterine length. The uterine length was 6.5 cm. At this time, the cervix was dilated with Hegar dilators without difficulty.    A rigid hysteroscope was then introduced into the uterine cavity through the endocervical canal. Again, the cervix appeared normal. There were no signs of polyps or uterine fibroids present. Both fallopian tube ostia were easily identified. With no pathology noted, the hysteroscope was removed. And a gentle scraping with the sharp curet performed until a gritty texture noted    The NovaSure instrument was then placed in the endometrial cavity. The instrument was set and the cavity width calculated to be 3.3 cm. With the NovaSure instrument applied to the endometrial cavity correctly, a cavity assessment test was undertaken using CO2. The CO2 test assured  an intact endometrial cavity and the ablation was begun. The ablation was done for 56 seconds at 118 araujo. Following complete ablation, the NovaSure instrument was removed.  The patient was awakened from anesthesia. She left the operating room awake and with vital signs stable.      Estimated blood loss: 10 mL    DISCHARGE PLAN:  The patient was taken to recovery and then transferred to the post operative floor. Once criteria are met she will be discharged home to be followed in the clinic in 2 weeks. Depo provera shot to be given prior to discharge    Current Discharge Medication List      START taking these medications    Details   HYDROcodone-acetaminophen (NORCO) 5-325 mg per tablet Take 1 tablet by mouth every 6 (six) hours as needed for Pain.  Qty: 5 tablet, Refills: 0    Comments: Quantity prescribed more than 7 day supply? No      ibuprofen (ADVIL,MOTRIN) 600 MG tablet Take 1 tablet (600 mg total) by mouth 3 (three) times daily.  Qty: 60 tablet, Refills: 1         STOP taking these medications       norelgestromin-ethinyl estradiol (ORTHO EVRA) 150-35 mcg/24 hr Comments:   Reason for Stopping:               Discharge Procedure Orders   X-Ray Chest PA And Lateral   Standing Status: Future Number of Occurrences: 1 Standing Exp. Date: 03/15/23     Order Specific Question Answer Comments   May the Radiologist modify the order per protocol to meet the clinical needs of the patient? Yes    Release to patient Immediate      CBC Auto Differential   Standing Status: Future Number of Occurrences: 1 Standing Exp. Date: 05/14/23     Comprehensive Metabolic Panel   Standing Status: Future Number of Occurrences: 1 Standing Exp. Date: 05/14/23     EKG 12-lead   Standing Status: Future Number of Occurrences: 1 Standing Exp. Date: 03/15/23     Type And Screen Preop   Standing Status: Future Number of Occurrences: 1 Standing Exp. Date: 05/14/23        Follow-up Information     Tonia Tabor MD Follow up in 2 week(s).     Specialty: Obstetrics and Gynecology  Why: follow up  Contact information:  [address] [address] [phone]

## 2022-03-21 NOTE — DISCHARGE INSTRUCTIONS
REST TODAY--RESUME ACTIVITY SLOWLY TOMORROW  RESUME CURRENT DIET  RESUME HOME MEDICATIONS  NO DRIVING OR DRINKING ALCOHOL FOR 24 HOURS AFTER SURGERY  NO TAMPONS,DOUCHING OR SEXUAL INTERCOURSE UNTIL SEEM BY DR MALHOTRA  FOLLOW-UP WITH DR MALHOTRA IN 2 WEEKS ( CALL FOR APPOINTMENT)  NO TUB BATHS --- SHOWER ONLY  IF ANY PROBLEMS,QUESTIONS,OR CONCERNS CALL DR LANDAVERDE OFFICE  OR REPORT TO THE ER IF URGENT    THANKS FOR CHOOSING [facility]

## 2022-03-21 NOTE — TRANSFER OF CARE
Anesthesia Transfer of Care Note    Patient: Cliff Matute    Procedure(s) Performed: Procedure(s) (LRB):  HYSTEROSCOPY, WITH DILATION AND CURETTAGE OF UTERUS AND HYDROTHERMAL ENDOMETRIAL ABLATION (N/A)    Patient location: PACU    Anesthesia Type: general    Transport from OR: Transported from OR on room air with adequate spontaneous ventilation    Post pain: adequate analgesia    Post assessment: no apparent anesthetic complications    Post vital signs: stable    Level of consciousness: awake    Nausea/Vomiting: no nausea/vomiting    Complications: none    Transfer of care protocol was followed      Last vitals:   /54  HR 70  RR 16  SPO2 99  T 36.7

## 2022-03-21 NOTE — INTERVAL H&P NOTE
The patient has been examined and the H&P has been reviewed:    I concur with the findings and no changes have occurred since H&P was written.    Surgery risks, benefits and alternative options discussed and understood by patient/family.          Active Hospital Problems    Diagnosis  POA    *Menorrhagia with irregular cycle [N92.1]  Yes      Resolved Hospital Problems   No resolved problems to display.

## 2022-03-22 NOTE — ANESTHESIA POSTPROCEDURE EVALUATION
Anesthesia Post Evaluation    Patient: Cliff Matute    Procedure(s) Performed: Procedure(s) (LRB):  HYSTEROSCOPY, WITH DILATION AND CURETTAGE OF UTERUS AND HYDROTHERMAL ENDOMETRIAL ABLATION (N/A)    Final Anesthesia Type: general      Patient location during evaluation: OPS  Patient participation: Yes- Able to Participate  Level of consciousness: awake  Post-procedure vital signs: reviewed and stable  Pain management: adequate  Airway patency: patent    PONV status at discharge: No PONV  Anesthetic complications: no      Cardiovascular status: blood pressure returned to baseline  Respiratory status: spontaneous ventilation  Hydration status: euvolemic  Follow-up not needed.          Vitals Value Taken Time   /70 03/21/22 1305   Temp 37.1 °C (98.8 °F) 03/21/22 1305   Pulse 85 03/21/22 1305   Resp 18 03/21/22 1305   SpO2 95 % 03/21/22 1305         Event Time   Out of Recovery 10:10:30         Pain/Sunitha Score: Pain Rating Prior to Med Admin: 8 (3/21/2022 11:07 AM)  Sunitha Score: 10 (3/21/2022  1:05 PM)

## 2022-03-24 LAB — SPECIMEN TO PATHOLOGY - SURGICAL: NORMAL

## 2022-04-19 ENCOUNTER — OFFICE VISIT (OUTPATIENT)
Dept: OBSTETRICS AND GYNECOLOGY | Facility: CLINIC | Age: 42
End: 2022-04-19
Payer: MEDICAID

## 2022-04-19 VITALS
HEART RATE: 91 BPM | BODY MASS INDEX: 28.25 KG/M2 | WEIGHT: 180 LBS | SYSTOLIC BLOOD PRESSURE: 150 MMHG | HEIGHT: 67 IN | DIASTOLIC BLOOD PRESSURE: 104 MMHG

## 2022-04-19 DIAGNOSIS — N92.1 MENORRHAGIA WITH IRREGULAR CYCLE: Primary | ICD-10-CM

## 2022-04-19 DIAGNOSIS — Z09 POSTOP CHECK: ICD-10-CM

## 2022-04-19 PROCEDURE — 1160F RVW MEDS BY RX/DR IN RCRD: CPT | Mod: CPTII,,, | Performed by: OBSTETRICS & GYNECOLOGY

## 2022-04-19 PROCEDURE — 99999 PR PBB SHADOW E&M-EST. PATIENT-LVL III: ICD-10-PCS | Mod: PBBFAC,,, | Performed by: OBSTETRICS & GYNECOLOGY

## 2022-04-19 PROCEDURE — 3080F DIAST BP >= 90 MM HG: CPT | Mod: CPTII,,, | Performed by: OBSTETRICS & GYNECOLOGY

## 2022-04-19 PROCEDURE — 99999 PR PBB SHADOW E&M-EST. PATIENT-LVL III: CPT | Mod: PBBFAC,,, | Performed by: OBSTETRICS & GYNECOLOGY

## 2022-04-19 PROCEDURE — 1160F PR REVIEW ALL MEDS BY PRESCRIBER/CLIN PHARMACIST DOCUMENTED: ICD-10-PCS | Mod: CPTII,,, | Performed by: OBSTETRICS & GYNECOLOGY

## 2022-04-19 PROCEDURE — 3077F PR MOST RECENT SYSTOLIC BLOOD PRESSURE >= 140 MM HG: ICD-10-PCS | Mod: CPTII,,, | Performed by: OBSTETRICS & GYNECOLOGY

## 2022-04-19 PROCEDURE — 99024 POSTOP FOLLOW-UP VISIT: CPT | Mod: ,,, | Performed by: OBSTETRICS & GYNECOLOGY

## 2022-04-19 PROCEDURE — 1159F PR MEDICATION LIST DOCUMENTED IN MEDICAL RECORD: ICD-10-PCS | Mod: CPTII,,, | Performed by: OBSTETRICS & GYNECOLOGY

## 2022-04-19 PROCEDURE — 3080F PR MOST RECENT DIASTOLIC BLOOD PRESSURE >= 90 MM HG: ICD-10-PCS | Mod: CPTII,,, | Performed by: OBSTETRICS & GYNECOLOGY

## 2022-04-19 PROCEDURE — 3008F BODY MASS INDEX DOCD: CPT | Mod: CPTII,,, | Performed by: OBSTETRICS & GYNECOLOGY

## 2022-04-19 PROCEDURE — 3077F SYST BP >= 140 MM HG: CPT | Mod: CPTII,,, | Performed by: OBSTETRICS & GYNECOLOGY

## 2022-04-19 PROCEDURE — 99213 OFFICE O/P EST LOW 20 MIN: CPT | Mod: PBBFAC | Performed by: OBSTETRICS & GYNECOLOGY

## 2022-04-19 PROCEDURE — 3008F PR BODY MASS INDEX (BMI) DOCUMENTED: ICD-10-PCS | Mod: CPTII,,, | Performed by: OBSTETRICS & GYNECOLOGY

## 2022-04-19 PROCEDURE — 1159F MED LIST DOCD IN RCRD: CPT | Mod: CPTII,,, | Performed by: OBSTETRICS & GYNECOLOGY

## 2022-04-19 PROCEDURE — 99024 PR POST-OP FOLLOW-UP VISIT: ICD-10-PCS | Mod: ,,, | Performed by: OBSTETRICS & GYNECOLOGY

## 2022-04-19 NOTE — PROGRESS NOTES
Subjective:    Patient ID: Cliff Matute is a 41 y.o. y.o. female    Chief Complaint:   Chief Complaint   Patient presents with    Post-op Evaluation     States she is still having same problems. Bleeding with intercourse, when she wipes after urinating she has blood on the toliet paper. States it is not heavy enough to wear a panty liner.       History of Present Illness:  Cliff presents today for follow up after D&C with endometrial ablation.  She states she is still having some bleeding with intercourse and some blood when she wipes after using the restroom.  Discussed that is has only been a couple weeks and it is normal to still have some discharge or light bleeding after procedure at this point.  Pathology report reviewed with patient      Review of Systems   Constitutional: Negative for chills, fatigue and fever.   Respiratory: Negative for shortness of breath.    Cardiovascular: Negative for chest pain.   Gastrointestinal: Negative for abdominal pain, constipation, diarrhea and nausea.   Genitourinary: Negative for bladder incontinence, dysuria, hot flashes, pelvic pain and vaginal bleeding.   Neurological: Negative for headaches.   Psychiatric/Behavioral: Negative for depression.         Objective:    Vital Signs:  Vitals:    04/19/22 1119   BP: (!) 150/104   Pulse: 91     Wt Readings from Last 1 Encounters:   04/19/22 81.6 kg (180 lb)     Body mass index is 28.19 kg/m².    Physical Exam:  General:  alert, no distress   Abdomen:  Soft, nontender   Extremities: No cyanosis, clubbing, edema     Reiterated expectations in the immediate postop period and reassurance and encouragement given.  All questions answered          Assessment:      1. Menorrhagia with irregular cycle    2. Postop check          Plan:      Menorrhagia with irregular cycle    Postop check    rtc for annual exam     Tonia Tabor MD, FACOG   04/19/2022 11:41 AM

## 2022-07-09 ENCOUNTER — HOSPITAL ENCOUNTER (EMERGENCY)
Facility: HOSPITAL | Age: 42
Discharge: HOME | End: 2022-07-09
Attending: EMERGENCY MEDICINE
Payer: MEDICAID

## 2022-07-09 VITALS
RESPIRATION RATE: 18 BRPM | TEMPERATURE: 98 F | DIASTOLIC BLOOD PRESSURE: 90 MMHG | HEART RATE: 86 BPM | OXYGEN SATURATION: 98 % | HEIGHT: 67 IN | SYSTOLIC BLOOD PRESSURE: 143 MMHG | WEIGHT: 189 LBS | BODY MASS INDEX: 29.66 KG/M2

## 2022-07-09 DIAGNOSIS — R20.2 PARESTHESIA: ICD-10-CM

## 2022-07-09 DIAGNOSIS — R20.2 TINGLING OF RIGHT UPPER EXTREMITY: Primary | ICD-10-CM

## 2022-07-09 LAB — POCT GLUCOSE: 135 MG/DL (ref 70–110)

## 2022-07-09 PROCEDURE — 99284 EMERGENCY DEPT VISIT MOD MDM: CPT

## 2022-07-09 PROCEDURE — 96372 THER/PROPH/DIAG INJ SC/IM: CPT | Performed by: EMERGENCY MEDICINE

## 2022-07-09 PROCEDURE — 63600175 PHARM REV CODE 636 W HCPCS: Performed by: EMERGENCY MEDICINE

## 2022-07-09 PROCEDURE — 82962 GLUCOSE BLOOD TEST: CPT

## 2022-07-09 PROCEDURE — 93005 ELECTROCARDIOGRAM TRACING: CPT

## 2022-07-09 PROCEDURE — 93010 EKG 12-LEAD: ICD-10-PCS | Mod: ,,, | Performed by: INTERNAL MEDICINE

## 2022-07-09 PROCEDURE — 93010 ELECTROCARDIOGRAM REPORT: CPT | Mod: 76,,, | Performed by: INTERNAL MEDICINE

## 2022-07-09 RX ORDER — METHYLPREDNISOLONE ACETATE 80 MG/ML
120 INJECTION, SUSPENSION INTRA-ARTICULAR; INTRALESIONAL; INTRAMUSCULAR; SOFT TISSUE
Status: COMPLETED | OUTPATIENT
Start: 2022-07-09 | End: 2022-07-09

## 2022-07-09 RX ADMIN — METHYLPREDNISOLONE ACETATE 120 MG: 80 INJECTION, SUSPENSION INTRA-ARTICULAR; INTRALESIONAL; INTRAMUSCULAR; SOFT TISSUE at 02:07

## 2022-07-09 NOTE — ED PROVIDER NOTES
"Encounter Date: 7/9/2022       History     Chief Complaint   Patient presents with    Numbness     Complains of intermittent numbness and tingling in right arm x 5 days. Denies injury. Denies neck problems.      42-year-old female with no significant past medical history presents the emergency department with tingling when she uses her right arm, tingling is in her right arm, worse with when she  something.  No history of this in the past.  Has been going on for 5 days.  No alleviating factors.  It comes and goes.  No history of diabetes, no chest pain, no shortness of breath.  No trauma.  No neck pain.  No strength issues, steady gait, normal smile, normal speech, normal strength in arms        Review of patient's allergies indicates:  No Known Allergies  Past Medical History:   Diagnosis Date    COVID-19 06/15/2020    Vaginal bleeding 03/2022     Past Surgical History:   Procedure Laterality Date    HYSTEROSCOPY WITH HYDROTHERMAL ABLATION OF ENDOMETRIUM WITH DILATION AND CURETTAGE N/A 3/21/2022    Procedure: HYSTEROSCOPY, WITH DILATION AND CURETTAGE OF UTERUS AND HYDROTHERMAL ENDOMETRIAL ABLATION;  Surgeon: Tonia Tabor MD;  Location: [facility];  Service: OB/GYN;  Laterality: N/A;  2  0630    TUBAL LIGATION      twice, became pregnant after 1st tubal "clip came off"     Family History   Problem Relation Age of Onset    No Known Problems Mother     No Known Problems Sister     No Known Problems Brother     No Known Problems Brother      Social History     Tobacco Use    Smoking status: Never Smoker    Smokeless tobacco: Never Used   Substance Use Topics    Alcohol use: Yes     Comment: OCC    Drug use: Never     Review of Systems   Constitutional: Negative for fever.   HENT: Negative for sore throat.    Respiratory: Negative for shortness of breath.    Cardiovascular: Negative for chest pain.   Gastrointestinal: Negative for nausea.   Genitourinary: Negative for dysuria.   Musculoskeletal: " Negative for back pain.   Skin: Negative for rash.   Neurological: Negative for weakness.   Hematological: Does not bruise/bleed easily.   All other systems reviewed and are negative.      Physical Exam     Initial Vitals [07/09/22 1334]   BP Pulse Resp Temp SpO2   (!) 143/90 86 18 98 °F (36.7 °C) 98 %      MAP       --         Physical Exam    Nursing note and vitals reviewed.  Constitutional: She appears well-developed and well-nourished. She is not diaphoretic. No distress.   HENT:   Head: Normocephalic and atraumatic.   Eyes: Conjunctivae and EOM are normal. Pupils are equal, round, and reactive to light.   Neck: Neck supple.   Normal range of motion.  Cardiovascular: Normal rate, regular rhythm, normal heart sounds and intact distal pulses.   No murmur heard.  Pulmonary/Chest: Breath sounds normal. No respiratory distress. She has no wheezes. She has no rhonchi. She has no rales. She exhibits no tenderness.   Abdominal: Abdomen is soft. Bowel sounds are normal.   Musculoskeletal:         General: No tenderness or edema. Normal range of motion.      Cervical back: Normal range of motion and neck supple.      Comments: Neurovascular intact, strong pulses, no motor deficits, no sensory deficits, steady gait, normal smile no facial droop     Neurological: She is alert and oriented to person, place, and time. She has normal strength. No cranial nerve deficit. GCS score is 15. GCS eye subscore is 4. GCS verbal subscore is 5. GCS motor subscore is 6.   Skin: Skin is warm and dry. Capillary refill takes less than 2 seconds.         ED Course   Procedures  Labs Reviewed   POCT GLUCOSE - Abnormal; Notable for the following components:       Result Value    POCT Glucose 135 (*)     All other components within normal limits   POCT GLUCOSE MONITORING CONTINUOUS     EKG Readings: (Independently Interpreted)   Initial Reading: No STEMI. Rhythm: Normal Sinus Rhythm. Ectopy: No Ectopy. Conduction: Normal. ST Segments: Normal ST  Segments. T Waves: Normal. Axis: Normal. Clinical Impression: Normal Sinus Rhythm     ECG Results          EKG 12-lead (In process)  Result time 07/09/22 14:41:57    In process by Interface, Lab In [facility] (07/09/22 14:41:57)                 Narrative:    Test Reason : R20.2,    Vent. Rate : 079 BPM     Atrial Rate : 079 BPM     P-R Int : 130 ms          QRS Dur : 080 ms      QT Int : 376 ms       P-R-T Axes : 041 054 000 degrees     QTc Int : 431 ms    Sinus rhythm with Premature atrial complexes  Nonspecific T wave abnormality  Abnormal ECG  When compared with ECG of 15-MAR-2022 09:42,  Premature atrial complexes are now Present  Nonspecific T wave abnormality now evident in Inferior leads  Nonspecific T wave abnormality now evident in Anterior-lateral leads    Referred By: [email or web address]   SELF           Confirmed By:                             Imaging Results    None          Medications   methylPREDNISolone acetate injection 120 mg (120 mg Intramuscular Given 7/9/22 1410)     Medical Decision Making:   Differential Diagnosis:   Paresthesia, neuropathy                      Clinical Impression:   Final diagnoses:  [R20.2] Tingling of right upper extremity (Primary)          ED Disposition Condition    Discharge Stable        ED Prescriptions     None        Follow-up Information     Follow up With Specialties Details Why Contact Info Additional Information    Primary care physician  In 2 days       [facility] Emergency Department Emergency Medicine  If symptoms worsen [address] [place] [address] [id number] [phone] Floor 1           Jose De Jesus Serrano MD  07/09/22 6971

## 2022-07-26 ENCOUNTER — HOSPITAL ENCOUNTER (OUTPATIENT)
Dept: RADIOLOGY | Facility: HOSPITAL | Age: 42
Discharge: HOME | End: 2022-07-26
Attending: INTERNAL MEDICINE
Payer: MEDICAID

## 2022-07-26 DIAGNOSIS — R52 PAIN: Primary | ICD-10-CM

## 2022-07-26 DIAGNOSIS — R52 PAIN: ICD-10-CM

## 2022-07-26 PROCEDURE — 73030 X-RAY EXAM OF SHOULDER: CPT | Mod: TC,RT

## 2022-08-25 DIAGNOSIS — Z12.31 ENCOUNTER FOR SCREENING MAMMOGRAM FOR MALIGNANT NEOPLASM OF BREAST: Primary | ICD-10-CM

## 2022-09-02 ENCOUNTER — HOSPITAL ENCOUNTER (OUTPATIENT)
Dept: RADIOLOGY | Facility: HOSPITAL | Age: 42
Discharge: HOME | End: 2022-09-02
Attending: INTERNAL MEDICINE
Payer: MEDICAID

## 2022-09-02 VITALS — HEIGHT: 67 IN | WEIGHT: 189 LBS | BODY MASS INDEX: 29.66 KG/M2

## 2022-09-02 DIAGNOSIS — Z12.31 ENCOUNTER FOR SCREENING MAMMOGRAM FOR MALIGNANT NEOPLASM OF BREAST: ICD-10-CM

## 2022-09-02 DIAGNOSIS — R52 PAIN: Primary | ICD-10-CM

## 2022-09-02 DIAGNOSIS — R52 PAIN: ICD-10-CM

## 2022-09-02 PROCEDURE — 72040 X-RAY EXAM NECK SPINE 2-3 VW: CPT | Mod: TC

## 2022-09-02 PROCEDURE — 77067 SCR MAMMO BI INCL CAD: CPT | Mod: TC

## 2022-10-19 ENCOUNTER — OFFICE VISIT (OUTPATIENT)
Dept: OBSTETRICS AND GYNECOLOGY | Facility: CLINIC | Age: 42
End: 2022-10-19
Payer: MEDICAID

## 2022-10-19 VITALS — DIASTOLIC BLOOD PRESSURE: 86 MMHG | BODY MASS INDEX: 28.66 KG/M2 | SYSTOLIC BLOOD PRESSURE: 128 MMHG | WEIGHT: 183 LBS

## 2022-10-19 DIAGNOSIS — N60.11 FIBROCYSTIC BREAST CHANGES, BILATERAL: ICD-10-CM

## 2022-10-19 DIAGNOSIS — N63.11 MASS OF UPPER OUTER QUADRANT OF RIGHT BREAST: ICD-10-CM

## 2022-10-19 DIAGNOSIS — N63.42 SUBAREOLAR MASS OF LEFT BREAST: ICD-10-CM

## 2022-10-19 DIAGNOSIS — N64.4 BREAST PAIN, RIGHT: Primary | ICD-10-CM

## 2022-10-19 DIAGNOSIS — N60.12 FIBROCYSTIC BREAST CHANGES, BILATERAL: ICD-10-CM

## 2022-10-19 PROCEDURE — 99213 PR OFFICE/OUTPT VISIT, EST, LEVL III, 20-29 MIN: ICD-10-PCS | Mod: S$PBB,,, | Performed by: OBSTETRICS & GYNECOLOGY

## 2022-10-19 PROCEDURE — 99999 PR PBB SHADOW E&M-EST. PATIENT-LVL II: CPT | Mod: PBBFAC,,, | Performed by: OBSTETRICS & GYNECOLOGY

## 2022-10-19 PROCEDURE — 99999 PR PBB SHADOW E&M-EST. PATIENT-LVL II: ICD-10-PCS | Mod: PBBFAC,,, | Performed by: OBSTETRICS & GYNECOLOGY

## 2022-10-19 PROCEDURE — 99213 OFFICE O/P EST LOW 20 MIN: CPT | Mod: S$PBB,,, | Performed by: OBSTETRICS & GYNECOLOGY

## 2022-10-19 PROCEDURE — 1160F RVW MEDS BY RX/DR IN RCRD: CPT | Mod: CPTII,,, | Performed by: OBSTETRICS & GYNECOLOGY

## 2022-10-19 PROCEDURE — 3044F HG A1C LEVEL LT 7.0%: CPT | Mod: CPTII,,, | Performed by: OBSTETRICS & GYNECOLOGY

## 2022-10-19 PROCEDURE — 99212 OFFICE O/P EST SF 10 MIN: CPT | Mod: PBBFAC | Performed by: OBSTETRICS & GYNECOLOGY

## 2022-10-19 PROCEDURE — 3074F SYST BP LT 130 MM HG: CPT | Mod: CPTII,,, | Performed by: OBSTETRICS & GYNECOLOGY

## 2022-10-19 PROCEDURE — 1159F MED LIST DOCD IN RCRD: CPT | Mod: CPTII,,, | Performed by: OBSTETRICS & GYNECOLOGY

## 2022-10-19 PROCEDURE — 3044F PR MOST RECENT HEMOGLOBIN A1C LEVEL <7.0%: ICD-10-PCS | Mod: CPTII,,, | Performed by: OBSTETRICS & GYNECOLOGY

## 2022-10-19 PROCEDURE — 3079F PR MOST RECENT DIASTOLIC BLOOD PRESSURE 80-89 MM HG: ICD-10-PCS | Mod: CPTII,,, | Performed by: OBSTETRICS & GYNECOLOGY

## 2022-10-19 PROCEDURE — 3079F DIAST BP 80-89 MM HG: CPT | Mod: CPTII,,, | Performed by: OBSTETRICS & GYNECOLOGY

## 2022-10-19 PROCEDURE — 1159F PR MEDICATION LIST DOCUMENTED IN MEDICAL RECORD: ICD-10-PCS | Mod: CPTII,,, | Performed by: OBSTETRICS & GYNECOLOGY

## 2022-10-19 PROCEDURE — 1160F PR REVIEW ALL MEDS BY PRESCRIBER/CLIN PHARMACIST DOCUMENTED: ICD-10-PCS | Mod: CPTII,,, | Performed by: OBSTETRICS & GYNECOLOGY

## 2022-10-19 PROCEDURE — 3074F PR MOST RECENT SYSTOLIC BLOOD PRESSURE < 130 MM HG: ICD-10-PCS | Mod: CPTII,,, | Performed by: OBSTETRICS & GYNECOLOGY

## 2022-10-19 RX ORDER — GINKGO BILOBA LEAF EXTRACT 60 MG
1 CAPSULE ORAL 2 TIMES DAILY
Qty: 100 EACH | Refills: 5 | Status: SHIPPED | OUTPATIENT
Start: 2022-10-19

## 2022-10-19 NOTE — PATIENT INSTRUCTIONS
Someone from Radiology Department will call you to schedule your mammogram and ultrasound.  If you do not get a call from them by Monday please contact our office.  Evening Primrose oil 1 capsule twice a day to help with breast pain

## 2022-10-19 NOTE — PROGRESS NOTES
Subjective:    Patient ID: Cliff Matute is a 42 y.o. y.o. female.     Chief Complaint:   Chief Complaint   Patient presents with    Breast Pain       History of Present Illness:   Cliff presents for evaluation of a breast lump on exam and breast pain noted by [facility] several weeks ago. She reports breast tenderness,  has found  masses, denies nipple discharge. Symptoms have been persistent since beginning. No LMP recorded. Patient has had an ablation..    Symptoms began in mid July.  Her 1st symptoms began as some tingling pain in her right arm.  She was seen at our ER when it began as she was concerned for stroke.  That workup was negative and she followed up with her primary care.  While waiting for her follow-up with primary care she felt a small mass on her left breast.  During her follow-up exam, primary care noted a mass/thickening of the right breast and screening mammogram was ordered.  This was found to be within normal.  Patient states the breast pain and arm pain has persisted.  It is she notes that the pain is more in the breast and travels up to the axilla.  No erythema, skin changes, nipple discharge noted.       Review of Systems   Constitutional:  Negative for chills, fatigue and fever.   Respiratory:  Negative for shortness of breath.    Cardiovascular:  Negative for chest pain.   Gastrointestinal:  Negative for abdominal pain, constipation, diarrhea and nausea.   Genitourinary:  Negative for bladder incontinence, dysuria, hot flashes, pelvic pain and vaginal bleeding.   Integumentary:  Positive for breast mass.   Neurological:  Negative for headaches.   Psychiatric/Behavioral:  Negative for depression.    Breast: Positive for mass and mastodynia.      Physical Exam:   Vital Signs:  Vitals:    10/19/22 0924   BP: 128/86       General:  alert, no distress   Breasts: Left Breast:  15 mm mobile, firm, nontender mass at the 3 o'clock position in the subareolar area.  Consistent with lymph node.  Remainder  of breast tissue with lumpy-bumpy consistency associated with fibrocystic changes.  No lymphadenopathy noted  Right Breast:  2-3 cm area of thickening in the upper outer quadrant at the 10 o'clock position.  This could represent fibrocystic changes.  No pain on palpation.  No lymphadenopathy noted     Discussed with patient the need for further evaluation in the form of diagnostic mammogram as well as ultrasound.  Explained that while mammogram is important it does not always detect every abnormality just as breast exam does not detect every abnormality.  They are needed in conjunction with each other to help fully assessed breast health.  Recommend evening Primrose oil twice daily to help with breast pain as workup continues.  Also included in differential is cervical radiculopathy which could cause the tingling and pain of the right arm.  All questions answered; reassurance and precautions given.    I spent a total of 20 minutes on the day of the visit.  This includes face to face time and non-face to face time preparing to see the patient (eg, review of tests), obtaining and/or reviewing separately obtained history, documenting clinical information in the electronic or other health record, independently interpreting results and communicating results to the patient/family/caregiver, or care coordinator.     Assessment:      1. Breast pain, right    2. Subareolar mass of left breast    3. Mass of upper outer quadrant of right breast    4. Fibrocystic breast changes, bilateral          Plan:      Breast pain, right  -     evening primrose oil 500 mg Cap; Take 1 capsule by mouth 2 (two) times a day.  Dispense: 100 each; Refill: 5  -     Mammo Digital Diagnostic Bilat with Gro[facility]; Future; Expected date: 10/19/2022  -     US Breast Bilateral Complete; Future; Expected date: 10/19/2022    Subareolar mass of left breast  -     Mammo Digital Diagnostic Bilat with [facility]; Future; Expected date: 10/19/2022  -     US Breast  Bilateral Complete; Future; Expected date: 10/19/2022    Mass of upper outer quadrant of right breast  -     Mammo Digital Diagnostic Bilat with [facility]; Future; Expected date: 10/19/2022  -     US Breast Bilateral Complete; Future; Expected date: 10/19/2022    Fibrocystic breast changes, bilateral        Tonia Tabor MD FACOG    10/19/2022  9:42 AM

## 2022-10-24 ENCOUNTER — PATIENT MESSAGE (OUTPATIENT)
Dept: RADIOLOGY | Facility: HOSPITAL | Age: 42
End: 2022-10-24
Payer: MEDICAID

## 2022-10-28 ENCOUNTER — HOSPITAL ENCOUNTER (OUTPATIENT)
Dept: RADIOLOGY | Facility: HOSPITAL | Age: 42
Discharge: HOME | End: 2022-10-28
Attending: OBSTETRICS & GYNECOLOGY
Payer: MEDICAID

## 2022-10-28 DIAGNOSIS — N63.42 SUBAREOLAR MASS OF LEFT BREAST: ICD-10-CM

## 2022-10-28 DIAGNOSIS — N63.11 MASS OF UPPER OUTER QUADRANT OF RIGHT BREAST: ICD-10-CM

## 2022-10-28 DIAGNOSIS — N64.4 BREAST PAIN, RIGHT: ICD-10-CM

## 2022-10-28 PROCEDURE — 76642 ULTRASOUND BREAST LIMITED: CPT | Mod: TC,50

## 2022-10-28 NOTE — PROGRESS NOTES
Please call patient regarding results.  Benign findings-sebaceous cysts noted.  May return to routine screening in 1 year

## 2023-02-28 ENCOUNTER — HOSPITAL ENCOUNTER (EMERGENCY)
Facility: HOSPITAL | Age: 43
Discharge: HOME | End: 2023-02-28
Attending: EMERGENCY MEDICINE
Payer: MEDICAID

## 2023-02-28 VITALS
HEIGHT: 67 IN | SYSTOLIC BLOOD PRESSURE: 117 MMHG | DIASTOLIC BLOOD PRESSURE: 85 MMHG | BODY MASS INDEX: 28.88 KG/M2 | OXYGEN SATURATION: 97 % | HEART RATE: 87 BPM | WEIGHT: 184 LBS | RESPIRATION RATE: 20 BRPM | TEMPERATURE: 98 F

## 2023-02-28 DIAGNOSIS — K14.8 TONGUE LESION: Primary | ICD-10-CM

## 2023-02-28 PROCEDURE — 99282 EMERGENCY DEPT VISIT SF MDM: CPT

## 2023-03-01 NOTE — DISCHARGE INSTRUCTIONS
If bleeding reoccurs is important to hold pressure.  It is recommended that you follow a soft, bland diet until follow-up with oral surgery.  You should try to schedule an appointment with all surgery within the next week.  Return to the emergency room for worsening condition.

## 2023-03-01 NOTE — ED PROVIDER NOTES
"Encounter Date: 2/28/2023       History     Chief Complaint   Patient presents with    Oral Problem     Patient reports intermittent bleeding from tongue. Denies injury, denies biting. Denies pain.      This is a 42-year-old black female with noncontributory past medical history who presents to the emergency department with complaints of bleeding lesion on the tongue intermittently over the last 3 months.  She reports that there is a small bump on the left lateral aspect of the tongue that is painless and occasionally bleeds "for no reason".  She denies known injury/trauma or any other relative symptoms at this time.    Review of patient's allergies indicates:  No Known Allergies  Past Medical History:   Diagnosis Date    COVID-19 06/15/2020    Vaginal bleeding 03/2022     Past Surgical History:   Procedure Laterality Date    HYSTEROSCOPY WITH HYDROTHERMAL ABLATION OF ENDOMETRIUM WITH DILATION AND CURETTAGE N/A 03/21/2022    Procedure: HYSTEROSCOPY, WITH DILATION AND CURETTAGE OF UTERUS AND HYDROTHERMAL ENDOMETRIAL ABLATION;  Surgeon: Tonia Tabor MD;  Location: [facility];  Service: OB/GYN;  Laterality: N/A;  2  0630    TUBAL LIGATION      twice, became pregnant after 1st tubal "clip came off"     Family History   Problem Relation Age of Onset    No Known Problems Mother     No Known Problems Father     No Known Problems Sister     No Known Problems Daughter     No Known Problems Maternal Aunt     No Known Problems Maternal Uncle     No Known Problems Paternal Aunt     No Known Problems Paternal Uncle     No Known Problems Maternal Grandmother     No Known Problems Maternal Grandfather     No Known Problems Paternal Grandmother     No Known Problems Paternal Grandfather     No Known Problems Brother     No Known Problems Brother     No Known Problems Other     Breast cancer Neg Hx     Ovarian cancer Neg Hx     BRCA 1/2 Neg Hx      Social History     Tobacco Use    Smoking status: Never    Smokeless tobacco: Never "   Substance Use Topics    Alcohol use: Yes     Comment: OCC    Drug use: Never     Review of Systems   Constitutional:  Negative for fever.   HENT:  Positive for dental problem.    Gastrointestinal:  Negative for vomiting.     Physical Exam     Initial Vitals [02/28/23 1903]   BP Pulse Resp Temp SpO2   117/85 87 20 98.3 °F (36.8 °C) 97 %      MAP       --         Physical Exam    Nursing note and vitals reviewed.  Constitutional: She appears well-developed and well-nourished. She is active. No distress.   HENT:   Head: Normocephalic and atraumatic.   Mouth/Throat:       Eyes: EOM are normal. Pupils are equal, round, and reactive to light.   Neck: Neck supple.   Normal range of motion.  Cardiovascular:  Normal rate.           Pulmonary/Chest: No respiratory distress.   Musculoskeletal:      Cervical back: Normal range of motion and neck supple.     Neurological: She is alert and oriented to person, place, and time. GCS score is 15. GCS eye subscore is 4. GCS verbal subscore is 5. GCS motor subscore is 6.   Skin: Skin is warm and dry. Capillary refill takes less than 2 seconds.   Psychiatric: She has a normal mood and affect. Her behavior is normal. Thought content normal.       ED Course   Procedures  Labs Reviewed - No data to display       Imaging Results    None          Medications - No data to display              ED Course as of 02/28/23 1928 Tue Feb 28, 2023 1927 Likely underlying hemangioma.  Patient to follow-up with oral surgeon. [CB]      ED Course User Index  [CB] Debra Alvarado NP                 Clinical Impression:   Final diagnoses:  [K14.8] Tongue lesion (Primary)        ED Disposition Condition    Discharge Stable          ED Prescriptions    None       Follow-up Information       Follow up With Specialties Details Why Contact Info    [facility] Oral Surgery Schedule an appointment as soon as possible for a visit in 1 day for re-evaluation of today's complaint [address]  [facility] [address]  [phone]    Van Fierro MD Oral and Maxillofacial Surgery Schedule an appointment as soon as possible for a visit in 1 day for re-evaluation of today's complaint [address] [facility] [address]  [phone]               Debra Alvarado NP  02/28/23 1928

## 2023-03-28 ENCOUNTER — PATIENT MESSAGE (OUTPATIENT)
Dept: RESEARCH | Facility: HOSPITAL | Age: 43
End: 2023-03-28
Payer: MEDICAID

## 2023-04-11 ENCOUNTER — PATIENT MESSAGE (OUTPATIENT)
Dept: RESEARCH | Facility: HOSPITAL | Age: 43
End: 2023-04-11
Payer: MEDICAID

## 2023-05-31 ENCOUNTER — HOSPITAL ENCOUNTER (EMERGENCY)
Facility: HOSPITAL | Age: 43
Discharge: HOME | End: 2023-05-31
Attending: EMERGENCY MEDICINE
Payer: MEDICAID

## 2023-05-31 VITALS
SYSTOLIC BLOOD PRESSURE: 148 MMHG | OXYGEN SATURATION: 97 % | TEMPERATURE: 99 F | HEART RATE: 87 BPM | DIASTOLIC BLOOD PRESSURE: 107 MMHG | WEIGHT: 184 LBS | RESPIRATION RATE: 18 BRPM | BODY MASS INDEX: 28.82 KG/M2

## 2023-05-31 DIAGNOSIS — M54.6 ACUTE LEFT-SIDED THORACIC BACK PAIN: Primary | ICD-10-CM

## 2023-05-31 LAB
B-HCG UR QL: NEGATIVE
BILIRUB UR QL STRIP: NEGATIVE
CLARITY UR: CLEAR
COLOR UR: YELLOW
GLUCOSE UR QL STRIP: NEGATIVE
HGB UR QL STRIP: ABNORMAL
KETONES UR QL STRIP: NEGATIVE
LEUKOCYTE ESTERASE UR QL STRIP: NEGATIVE
NITRITE UR QL STRIP: NEGATIVE
PH UR STRIP: 8 [PH] (ref 5–8)
PROT UR QL STRIP: NEGATIVE
SP GR UR STRIP: 1.01 (ref 1–1.03)
URN SPEC COLLECT METH UR: ABNORMAL
UROBILINOGEN UR STRIP-ACNC: NEGATIVE EU/DL

## 2023-05-31 PROCEDURE — 81025 URINE PREGNANCY TEST: CPT | Performed by: NURSE PRACTITIONER

## 2023-05-31 PROCEDURE — 25000003 PHARM REV CODE 250: Performed by: NURSE PRACTITIONER

## 2023-05-31 PROCEDURE — 81003 URINALYSIS AUTO W/O SCOPE: CPT | Performed by: NURSE PRACTITIONER

## 2023-05-31 PROCEDURE — 99284 EMERGENCY DEPT VISIT MOD MDM: CPT

## 2023-05-31 RX ORDER — DICLOFENAC SODIUM 75 MG/1
75 TABLET, DELAYED RELEASE ORAL 2 TIMES DAILY
Qty: 10 TABLET | Refills: 0 | Status: SHIPPED | OUTPATIENT
Start: 2023-05-31 | End: 2023-06-05

## 2023-05-31 RX ORDER — CYCLOBENZAPRINE HCL 10 MG
10 TABLET ORAL 3 TIMES DAILY PRN
Qty: 12 TABLET | Refills: 0 | Status: SHIPPED | OUTPATIENT
Start: 2023-05-31 | End: 2023-06-04

## 2023-05-31 RX ORDER — HYDROCODONE BITARTRATE AND ACETAMINOPHEN 10; 325 MG/1; MG/1
1 TABLET ORAL
Status: COMPLETED | OUTPATIENT
Start: 2023-05-31 | End: 2023-05-31

## 2023-05-31 RX ORDER — ONDANSETRON 4 MG/1
4 TABLET, ORALLY DISINTEGRATING ORAL
Status: COMPLETED | OUTPATIENT
Start: 2023-05-31 | End: 2023-05-31

## 2023-05-31 RX ADMIN — HYDROCODONE BITARTRATE AND ACETAMINOPHEN 1 TABLET: 10; 325 TABLET ORAL at 10:05

## 2023-05-31 RX ADMIN — ONDANSETRON 4 MG: 4 TABLET, ORALLY DISINTEGRATING ORAL at 10:05

## 2023-05-31 NOTE — ED PROVIDER NOTES
"Encounter Date: 5/31/2023       History     Chief Complaint   Patient presents with    Back Pain     Complains of pain in left middle back.  Stated that it started this morning.  Denies injury.  Stated it started when she was trying to get up off the toilet after a BM.       This is a 41 y/o black female with noncontributory pmhx who presents to the ED with complaints of left midback pain that began approximately 3 hours prior to arrival.  Patient reports that she had a bowel movement and subsequently developed the pain that is constant, exacerbated by movement, improved with rest.  She states that pain radiates from the left upper/mid back to the lateral chest.  She denies rash, fever, URI signs and symptoms, cough, chest pain, shortness of breath, abdominal pain, vomiting, or dysuria.    Review of patient's allergies indicates:  No Known Allergies  Past Medical History:   Diagnosis Date    COVID-19 06/15/2020    Vaginal bleeding 03/2022     Past Surgical History:   Procedure Laterality Date    HYSTEROSCOPY WITH HYDROTHERMAL ABLATION OF ENDOMETRIUM WITH DILATION AND CURETTAGE N/A 03/21/2022    Procedure: HYSTEROSCOPY, WITH DILATION AND CURETTAGE OF UTERUS AND HYDROTHERMAL ENDOMETRIAL ABLATION;  Surgeon: Tonia Tabor MD;  Location: [facility];  Service: OB/GYN;  Laterality: N/A;  2  0630    TUBAL LIGATION      twice, became pregnant after 1st tubal "clip came off"     Family History   Problem Relation Age of Onset    No Known Problems Mother     No Known Problems Father     No Known Problems Sister     No Known Problems Daughter     No Known Problems Maternal Aunt     No Known Problems Maternal Uncle     No Known Problems Paternal Aunt     No Known Problems Paternal Uncle     No Known Problems Maternal Grandmother     No Known Problems Maternal Grandfather     No Known Problems Paternal Grandmother     No Known Problems Paternal Grandfather     No Known Problems Brother     No Known Problems Brother     No Known " Problems Other     Breast cancer Neg Hx     Ovarian cancer Neg Hx     BRCA 1/2 Neg Hx      Social History     Tobacco Use    Smoking status: Never    Smokeless tobacco: Never   Substance Use Topics    Alcohol use: Yes     Comment: OCC    Drug use: Never     Review of Systems   Constitutional:  Negative for fever.   Gastrointestinal:  Negative for abdominal pain, nausea and vomiting.   Genitourinary:  Negative for dysuria and pelvic pain.   Musculoskeletal:  Positive for back pain.     Physical Exam     Initial Vitals [05/31/23 1032]   BP Pulse Resp Temp SpO2   (!) 148/107 87 18 98.7 °F (37.1 °C) 97 %      MAP       --         Physical Exam    Nursing note and vitals reviewed.  Constitutional: She appears well-developed and well-nourished. She is active. No distress.   HENT:   Head: Normocephalic and atraumatic.   Eyes: EOM are normal. Pupils are equal, round, and reactive to light.   Neck: Neck supple.   Normal range of motion.  Cardiovascular:  Normal rate, regular rhythm and normal heart sounds.           Pulmonary/Chest: Breath sounds normal. No respiratory distress.   Musculoskeletal:      Cervical back: Normal range of motion and neck supple.      Comments: There are a few scattered papules to the left lower back below area of pain, however similar lesions present sparsely over the rest of the back, most likely acne; does not appear vesicular. Pt is tender over the left thoracic paraspinal region; no central vertebral prominence tenderness. Muscle strength to ble is 5/5.      Neurological: She is alert and oriented to person, place, and time. GCS eye subscore is 4. GCS verbal subscore is 5. GCS motor subscore is 6.   Skin: Skin is warm and dry. Capillary refill takes less than 2 seconds.   Psychiatric: She has a normal mood and affect. Her behavior is normal. Thought content normal.       ED Course   Procedures  Labs Reviewed   URINALYSIS, REFLEX TO URINE CULTURE - Abnormal; Notable for the following  components:       Result Value    Occult Blood UA Trace (*)     All other components within normal limits    Narrative:     Preferred Collection Type->Urine, Clean Catch  Specimen Source->Urine   PREGNANCY TEST, URINE RAPID    Narrative:     Specimen Source->Urine          Imaging Results    None          Medications   HYDROcodone-acetaminophen  mg per tablet 1 tablet (1 tablet Oral Given 5/31/23 1038)   ondansetron disintegrating tablet 4 mg (4 mg Oral Given 5/31/23 1038)                 ED Course as of 05/31/23 1118   Wed May 31, 2023   1115 UPT negative, no concerning findings on urinalysis.  Likely underlying musculoskeletal etiology given pain is worse with movement and palpation. [CB]      ED Course User Index  [CB] Debra Alvarado NP                 Clinical Impression:   Final diagnoses:  [M54.6] Acute left-sided thoracic back pain (Primary)        ED Disposition Condition    Discharge Stable          ED Prescriptions       Medication Sig Dispense Start Date End Date Auth. Provider    diclofenac (VOLTAREN) 75 MG EC tablet Take 1 tablet (75 mg total) by mouth 2 (two) times daily. for 5 days 10 tablet 5/31/2023 6/5/2023 Debra Alvarado NP    cyclobenzaprine (FLEXERIL) 10 MG tablet Take 1 tablet (10 mg total) by mouth 3 (three) times daily as needed for Muscle spasms (as needed for muscle spasms). 12 tablet 5/31/2023 6/4/2023 Debra Alvarado NP          Follow-up Information       Follow up With Specialties Details Why Contact Info    PCP Follow UP  Schedule an appointment as soon as possible for a visit in 2 days for follow-up, for re-evaluation of today's complaint              Debra Alvarado NP  05/31/23 1118

## 2023-05-31 NOTE — DISCHARGE INSTRUCTIONS
Take medication as directed.  Monitor your back for worsening or development of rash.  Follow-up with your doctor in 2-3 days and return to the emergency room for worsening condition.

## 2023-05-31 NOTE — Clinical Note
"Cliff Hurtado" Giovani was seen and treated in our emergency department on 5/31/2023.  She may return to work on 06/02/2023.       If you have any questions or concerns, please don't hesitate to call.      Debra Alvarado NP"

## 2023-08-30 ENCOUNTER — HOSPITAL ENCOUNTER (OUTPATIENT)
Dept: RADIOLOGY | Facility: HOSPITAL | Age: 43
Discharge: HOME | End: 2023-08-30
Attending: INTERNAL MEDICINE
Payer: MEDICAID

## 2023-08-30 DIAGNOSIS — R13.10 DYSPHAGIA: Primary | ICD-10-CM

## 2023-08-30 DIAGNOSIS — R13.10 DYSPHAGIA: ICD-10-CM

## 2023-08-30 PROCEDURE — A9698 NON-RAD CONTRAST MATERIALNOC: HCPCS | Performed by: INTERNAL MEDICINE

## 2023-08-30 PROCEDURE — 74220 X-RAY XM ESOPHAGUS 1CNTRST: CPT | Mod: TC

## 2023-08-30 PROCEDURE — 25500020 PHARM REV CODE 255: Performed by: INTERNAL MEDICINE

## 2023-08-30 RX ADMIN — BARIUM SULFATE 135 ML: 980 POWDER, FOR SUSPENSION ORAL at 11:08

## 2023-08-30 RX ADMIN — BARIUM SULFATE 176 G: 960 POWDER, FOR SUSPENSION ORAL at 11:08

## 2023-08-31 ENCOUNTER — HOSPITAL ENCOUNTER (EMERGENCY)
Facility: HOSPITAL | Age: 43
Discharge: HOME | End: 2023-08-31
Attending: FAMILY MEDICINE
Payer: MEDICAID

## 2023-08-31 VITALS
HEIGHT: 67 IN | RESPIRATION RATE: 20 BRPM | DIASTOLIC BLOOD PRESSURE: 82 MMHG | BODY MASS INDEX: 29.03 KG/M2 | TEMPERATURE: 99 F | HEART RATE: 80 BPM | OXYGEN SATURATION: 96 % | WEIGHT: 185 LBS | SYSTOLIC BLOOD PRESSURE: 145 MMHG

## 2023-08-31 DIAGNOSIS — R47.01 APHASIA: ICD-10-CM

## 2023-08-31 DIAGNOSIS — F41.9 ANXIETY: ICD-10-CM

## 2023-08-31 DIAGNOSIS — R47.9 SPEECH DISTURBANCE, UNSPECIFIED TYPE: Primary | ICD-10-CM

## 2023-08-31 DIAGNOSIS — F43.21 GRIEF: ICD-10-CM

## 2023-08-31 LAB
ALBUMIN SERPL BCP-MCNC: 4.5 G/DL (ref 3.5–5.2)
ALP SERPL-CCNC: 69 U/L (ref 55–135)
ALT SERPL W/O P-5'-P-CCNC: 15 U/L (ref 10–44)
AMPHET+METHAMPHET UR QL: NEGATIVE
ANION GAP SERPL CALC-SCNC: 3 MMOL/L (ref 8–16)
APTT PPP: 21.7 SEC (ref 21–32)
AST SERPL-CCNC: 12 U/L (ref 10–40)
BACTERIA #/AREA URNS HPF: NEGATIVE /HPF
BARBITURATES UR QL SCN>200 NG/ML: NEGATIVE
BASOPHILS # BLD AUTO: 0.04 K/UL (ref 0–0.2)
BASOPHILS NFR BLD: 0.4 % (ref 0–1.9)
BENZODIAZ UR QL SCN>200 NG/ML: NEGATIVE
BILIRUB SERPL-MCNC: 0.3 MG/DL (ref 0.1–1)
BILIRUB UR QL STRIP: NEGATIVE
BUN SERPL-MCNC: 7 MG/DL (ref 6–20)
BZE UR QL SCN: NEGATIVE
CALCIUM SERPL-MCNC: 8.8 MG/DL (ref 8.7–10.5)
CANNABINOIDS UR QL SCN: ABNORMAL
CHLORIDE SERPL-SCNC: 112 MMOL/L (ref 95–110)
CLARITY UR: ABNORMAL
CO2 SERPL-SCNC: 26 MMOL/L (ref 23–29)
COLOR UR: YELLOW
CREAT SERPL-MCNC: 1.1 MG/DL (ref 0.5–1.4)
CREAT UR-MCNC: 134 MG/DL (ref 15–325)
DIFFERENTIAL METHOD: ABNORMAL
EOSINOPHIL # BLD AUTO: 0.1 K/UL (ref 0–0.5)
EOSINOPHIL NFR BLD: 0.6 % (ref 0–8)
ERYTHROCYTE [DISTWIDTH] IN BLOOD BY AUTOMATED COUNT: 14.6 % (ref 11.5–14.5)
EST. GFR  (NO RACE VARIABLE): >60 ML/MIN/1.73 M^2
GLUCOSE SERPL-MCNC: 119 MG/DL (ref 70–110)
GLUCOSE UR QL STRIP: NEGATIVE
HCT VFR BLD AUTO: 37.2 % (ref 37–48.5)
HGB BLD-MCNC: 12.8 G/DL (ref 12–16)
HGB UR QL STRIP: ABNORMAL
HYALINE CASTS #/AREA URNS LPF: 0.4 /LPF
IMM GRANULOCYTES # BLD AUTO: 0.02 K/UL (ref 0–0.04)
IMM GRANULOCYTES NFR BLD AUTO: 0.2 % (ref 0–0.5)
KETONES UR QL STRIP: NEGATIVE
LEUKOCYTE ESTERASE UR QL STRIP: ABNORMAL
LYMPHOCYTES # BLD AUTO: 1.5 K/UL (ref 1–4.8)
LYMPHOCYTES NFR BLD: 14.5 % (ref 18–48)
MCH RBC QN AUTO: 30.3 PG (ref 27–31)
MCHC RBC AUTO-ENTMCNC: 34.4 G/DL (ref 32–36)
MCV RBC AUTO: 88 FL (ref 82–98)
METHADONE UR QL SCN>300 NG/ML: NEGATIVE
MICROSCOPIC COMMENT: ABNORMAL
MONOCYTES # BLD AUTO: 0.5 K/UL (ref 0.3–1)
MONOCYTES NFR BLD: 4.7 % (ref 4–15)
NEUTROPHILS # BLD AUTO: 8.5 K/UL (ref 1.8–7.7)
NEUTROPHILS NFR BLD: 79.6 % (ref 38–73)
NITRITE UR QL STRIP: NEGATIVE
NRBC BLD-RTO: 0 /100 WBC
OPIATES UR QL SCN: NEGATIVE
PCP UR QL SCN>25 NG/ML: NEGATIVE
PH UR STRIP: 7 [PH] (ref 5–8)
PLATELET # BLD AUTO: 326 K/UL (ref 150–450)
PMV BLD AUTO: 9.9 FL (ref 9.2–12.9)
POTASSIUM SERPL-SCNC: 3.8 MMOL/L (ref 3.5–5.1)
PROT SERPL-MCNC: 8.1 G/DL (ref 6–8.4)
PROT UR QL STRIP: NEGATIVE
RBC # BLD AUTO: 4.23 M/UL (ref 4–5.4)
RBC #/AREA URNS HPF: 2 /HPF (ref 0–4)
SODIUM SERPL-SCNC: 141 MMOL/L (ref 136–145)
SP GR UR STRIP: 1.01 (ref 1–1.03)
SQUAMOUS #/AREA URNS HPF: 3 /HPF
TOXICOLOGY INFORMATION: ABNORMAL
URN SPEC COLLECT METH UR: ABNORMAL
UROBILINOGEN UR STRIP-ACNC: 1 EU/DL
WBC # BLD AUTO: 10.65 K/UL (ref 3.9–12.7)
WBC #/AREA URNS HPF: 3 /HPF (ref 0–5)

## 2023-08-31 PROCEDURE — 99285 EMERGENCY DEPT VISIT HI MDM: CPT | Mod: 25

## 2023-08-31 PROCEDURE — 25000003 PHARM REV CODE 250: Performed by: FAMILY MEDICINE

## 2023-08-31 PROCEDURE — 80053 COMPREHEN METABOLIC PANEL: CPT | Performed by: FAMILY MEDICINE

## 2023-08-31 PROCEDURE — 80307 DRUG TEST PRSMV CHEM ANLYZR: CPT | Performed by: FAMILY MEDICINE

## 2023-08-31 PROCEDURE — 93005 ELECTROCARDIOGRAM TRACING: CPT

## 2023-08-31 PROCEDURE — 85730 THROMBOPLASTIN TIME PARTIAL: CPT | Performed by: FAMILY MEDICINE

## 2023-08-31 PROCEDURE — 36415 COLL VENOUS BLD VENIPUNCTURE: CPT | Performed by: FAMILY MEDICINE

## 2023-08-31 PROCEDURE — 93010 EKG 12-LEAD: ICD-10-PCS | Mod: ,,, | Performed by: INTERNAL MEDICINE

## 2023-08-31 PROCEDURE — 85025 COMPLETE CBC W/AUTO DIFF WBC: CPT | Performed by: FAMILY MEDICINE

## 2023-08-31 PROCEDURE — 81000 URINALYSIS NONAUTO W/SCOPE: CPT | Mod: 59 | Performed by: FAMILY MEDICINE

## 2023-08-31 PROCEDURE — 93010 ELECTROCARDIOGRAM REPORT: CPT | Mod: ,,, | Performed by: INTERNAL MEDICINE

## 2023-08-31 RX ORDER — ALPRAZOLAM 0.25 MG/1
0.25 TABLET ORAL 2 TIMES DAILY PRN
Qty: 10 TABLET | Refills: 0 | Status: SHIPPED | OUTPATIENT
Start: 2023-08-31 | End: 2023-09-30

## 2023-08-31 RX ORDER — ALPRAZOLAM 0.5 MG/1
0.5 TABLET ORAL
Status: COMPLETED | OUTPATIENT
Start: 2023-08-31 | End: 2023-08-31

## 2023-08-31 RX ADMIN — ALPRAZOLAM 0.5 MG: 0.5 TABLET ORAL at 08:08

## 2023-09-01 NOTE — ED PROVIDER NOTES
"Encounter Date: 8/31/2023       History     Chief Complaint   Patient presents with    Aphasia     Pt presents to ED with complaints of headache / facial numbness and sudden onset of aphasia. Mother stated that she spoke to pt at approx 5:30 and was normal with complaints of severe headache. No neuro deficits noted ( no unilateral weakness / no facial droop) - following commands appropriately. Pt able to write on paper that she began experiencing "numbness" around 10am with headache beginning at 1pm.        Neurologic Problem  The primary symptoms include altered mental status and speech change. Primary symptoms do not include headaches, syncope, loss of consciousness, seizures, dizziness, visual change, paresthesias, focal weakness, loss of sensation, memory loss, fever or nausea. The symptoms began 3 to 5 hours ago. The symptoms are unchanged. Context: occured after anxiety attack.   The change in mental status began today. The change in mental status has been unchanged since its onset.   Additional symptoms include anxiety. Additional symptoms do not include neck stiffness, weakness, pain, lower back pain, leg pain, loss of balance, photophobia, aura, hallucinations, nystagmus, taste disturbance, hyperacusis, hearing loss, tinnitus, vertigo, irritability or dysphoric mood. Medical issues also include drug use.     Review of patient's allergies indicates:  No Known Allergies  Past Medical History:   Diagnosis Date    COVID-19 06/15/2020    Vaginal bleeding 03/2022     Past Surgical History:   Procedure Laterality Date    HYSTEROSCOPY WITH HYDROTHERMAL ABLATION OF ENDOMETRIUM WITH DILATION AND CURETTAGE N/A 03/21/2022    Procedure: HYSTEROSCOPY, WITH DILATION AND CURETTAGE OF UTERUS AND HYDROTHERMAL ENDOMETRIAL ABLATION;  Surgeon: Tonia Tabor MD;  Location: [facility];  Service: OB/GYN;  Laterality: N/A;  2  0630    TUBAL LIGATION      twice, became pregnant after 1st tubal "clip came off"     Family History "   Problem Relation Age of Onset    No Known Problems Mother     No Known Problems Father     No Known Problems Sister     No Known Problems Daughter     No Known Problems Maternal Aunt     No Known Problems Maternal Uncle     No Known Problems Paternal Aunt     No Known Problems Paternal Uncle     No Known Problems Maternal Grandmother     No Known Problems Maternal Grandfather     No Known Problems Paternal Grandmother     No Known Problems Paternal Grandfather     No Known Problems Brother     No Known Problems Brother     No Known Problems Other     Breast cancer Neg Hx     Ovarian cancer Neg Hx     BRCA 1/2 Neg Hx      Social History     Tobacco Use    Smoking status: Never    Smokeless tobacco: Never   Substance Use Topics    Alcohol use: Yes     Comment: OCC    Drug use: Never     Review of Systems   Constitutional:  Negative for fever and irritability.   HENT:  Negative for hearing loss and tinnitus.    Eyes:  Negative for photophobia.   Cardiovascular:  Negative for syncope.   Gastrointestinal:  Negative for nausea.   Musculoskeletal:  Negative for neck stiffness.   Neurological:  Positive for speech change. Negative for dizziness, vertigo, focal weakness, seizures, loss of consciousness, weakness, headaches, paresthesias and loss of balance.   Psychiatric/Behavioral:  Negative for dysphoric mood, hallucinations and memory loss.    All other systems reviewed and are negative.      Physical Exam     Initial Vitals   BP Pulse Resp Temp SpO2   08/31/23 1854 08/31/23 1854 08/31/23 1854 08/31/23 1905 08/31/23 1854   (!) 141/83 92 18 98.7 °F (37.1 °C) 98 %      MAP       --                Physical Exam    Nursing note and vitals reviewed.  Constitutional: Vital signs are normal. She appears well-developed and well-nourished. She is not diaphoretic.  Non-toxic appearance. She does not have a sickly appearance.   HENT:   Head: Normocephalic and atraumatic.   Right Ear: Hearing, tympanic membrane, external ear and  ear canal normal.   Left Ear: Tympanic membrane, external ear and ear canal normal.   Mouth/Throat: Uvula is midline, oropharynx is clear and moist and mucous membranes are normal.   Eyes: Conjunctivae, EOM and lids are normal. Pupils are equal, round, and reactive to light. Lids are everted and swept, no foreign bodies found.   Neck: Trachea normal and phonation normal. Neck supple.   Normal range of motion.   Full passive range of motion without pain.     Cardiovascular:  Normal rate, regular rhythm, normal heart sounds, intact distal pulses and normal pulses.           Pulmonary/Chest: Breath sounds normal. No respiratory distress. She has no wheezes. She has no rhonchi. She has no rales. She exhibits no tenderness.   Abdominal: Abdomen is soft. Bowel sounds are normal. There is no abdominal tenderness.   Musculoskeletal:         General: No edema. Normal range of motion.      Cervical back: Normal, full passive range of motion without pain, normal range of motion and neck supple.      Thoracic back: Normal.      Lumbar back: Normal.     Neurological: She is alert and oriented to person, place, and time. She has normal strength. She displays no atrophy and no tremor. No cranial nerve deficit or sensory deficit. She exhibits normal muscle tone. She displays no seizure activity. Coordination and gait normal. GCS eye subscore is 4. GCS verbal subscore is 5. GCS motor subscore is 6.   Patient is able to talk but only after pain is admitted.   Skin: Skin is intact.   Psychiatric: Her speech is normal and behavior is normal. Her mood appears anxious. Her affect is not angry, not blunt, not labile and not inappropriate. She is not agitated, not aggressive, not hyperactive, not slowed, not withdrawn and not combative. Thought content is not paranoid and not delusional. She exhibits a depressed mood. She expresses no homicidal and no suicidal ideation. She expresses no suicidal plans and no homicidal plans.         ED  Course   Procedures  Labs Reviewed   CBC W/ AUTO DIFFERENTIAL - Abnormal; Notable for the following components:       Result Value    RDW 14.6 (*)     Gran # (ANC) 8.5 (*)     Gran % 79.6 (*)     Lymph % 14.5 (*)     All other components within normal limits   COMPREHENSIVE METABOLIC PANEL - Abnormal; Notable for the following components:    Chloride 112 (*)     Glucose 119 (*)     Anion Gap 3 (*)     All other components within normal limits   URINALYSIS, REFLEX TO URINE CULTURE - Abnormal; Notable for the following components:    Appearance, UA Cloudy (*)     Occult Blood UA 1+ (*)     Leukocytes, UA Trace (*)     All other components within normal limits    Narrative:     Preferred Collection Type->Urine, Clean Catch  Specimen Source->Urine   DRUG SCREEN PANEL, URINE EMERGENCY - Abnormal; Notable for the following components:    THC Presumptive Positive (*)     All other components within normal limits    Narrative:     Specimen Source->Urine   URINALYSIS MICROSCOPIC - Abnormal; Notable for the following components:    Hyaline Casts, UA 0.4 (*)     All other components within normal limits    Narrative:     Preferred Collection Type->Urine, Clean Catch  Specimen Source->Urine   APTT     EKG Readings: (Independently Interpreted)   Rhythm: Normal Sinus Rhythm. Ectopy: No Ectopy. Conduction: Normal. ST Segments: Normal ST Segments. Clinical Impression: Normal Sinus Rhythm       Imaging Results              CT Head Without Contrast (Final result)  Result time 08/31/23 23:40:05      Final result by Héctor José MD (08/31/23 23:40:05)                   Impression:      No acute intracranial process detected.    Preliminary report provided by [facility] soon after completion of this study.      Electronically signed by: Héctor José MD  Date:    08/31/2023  Time:    23:40               Narrative:    EXAMINATION:  CT HEAD WITHOUT CONTRAST    CLINICAL HISTORY:  Altered level of consciousness mental status change,  unknown cause;    TECHNIQUE:  Axial CT images were obtained from the skull base to the vertex without contrast. Iterative reconstruction technique was used.  CT/Cardiac nuclear examinations in the past 12 months: 0    COMPARISON:  CT head 04/16/2017    FINDINGS:  Ventricles and basal cisterns are midline and without effacement. No evidence of acute hemorrhage, midline shift, mass, or mass effect. No evidence of acute regional infarct. No detected extra-axial fluid collections. Fluid opacification mid aspect of the left ethmoid air cells.  Remainder of the imaged paranasal sinuses and mastoid air cells are clear.  Calvarium is intact.                        Wet Read by Billy Martin Jr., MD (08/31/23 20:02:28, [facility], Emergency Medicine)    No acute abnormality                                      Medications   ALPRAZolam tablet 0.5 mg (0.5 mg Oral Given 8/31/23 2043)     Medical Decision Making  CVA, anxiety, grief, drug use    Patient has no acute abnormalities in physical exam, CT, labs except for positive UDS for marijuana.  Presentation symptoms or consistent with anxiety and grief reaction.  Patient denies any thoughts of harm to self or others.  Patient will be discharged home with instructions to follow-up with PCP for further treatment for anxiety.  Discussed with patient and family if symptoms increase or worsen to return to ED or see PCP.  Patient family report they understand plan of care and ready for discharge home    Amount and/or Complexity of Data Reviewed  Labs: ordered.  Radiology: ordered and independent interpretation performed.    Risk  Prescription drug management.                               Clinical Impression:   Final diagnoses:  [R47.01] Aphasia  [R47.9] Speech disturbance, unspecified type (Primary)  [F41.9] Anxiety  [F43.21] Grief        ED Disposition Condition    Discharge Stable          ED Prescriptions       Medication Sig Dispense Start Date End Date  Auth. Provider    ALPRAZolam (XANAX) 0.25 MG tablet Take 1 tablet (0.25 mg total) by mouth 2 (two) times daily as needed for Anxiety. 10 tablet 8/31/2023 9/30/2023 Billy Martin Jr., MD          Follow-up Information    None          Billy Martin Jr., MD  09/01/23 0999

## 2023-09-18 ENCOUNTER — HOSPITAL ENCOUNTER (EMERGENCY)
Facility: HOSPITAL | Age: 43
Discharge: HOME | End: 2023-09-18
Attending: EMERGENCY MEDICINE
Payer: MEDICAID

## 2023-09-18 VITALS
BODY MASS INDEX: 29.35 KG/M2 | RESPIRATION RATE: 18 BRPM | TEMPERATURE: 99 F | OXYGEN SATURATION: 99 % | HEIGHT: 67 IN | SYSTOLIC BLOOD PRESSURE: 157 MMHG | HEART RATE: 92 BPM | DIASTOLIC BLOOD PRESSURE: 96 MMHG | WEIGHT: 187 LBS

## 2023-09-18 DIAGNOSIS — F06.4 ANXIETY DISORDER DUE TO GENERAL MEDICAL CONDITION: Primary | ICD-10-CM

## 2023-09-18 DIAGNOSIS — R20.2 PARESTHESIAS: ICD-10-CM

## 2023-09-18 LAB
ALBUMIN SERPL BCP-MCNC: 4 G/DL (ref 3.5–5.2)
ALP SERPL-CCNC: 67 U/L (ref 55–135)
ALT SERPL W/O P-5'-P-CCNC: 12 U/L (ref 10–44)
ANION GAP SERPL CALC-SCNC: 4 MMOL/L (ref 3–11)
AST SERPL-CCNC: 9 U/L (ref 10–40)
BASOPHILS # BLD AUTO: 0.05 K/UL (ref 0–0.2)
BASOPHILS NFR BLD: 0.5 % (ref 0–1.9)
BILIRUB SERPL-MCNC: 0.4 MG/DL (ref 0.1–1)
BUN SERPL-MCNC: 10 MG/DL (ref 6–20)
CALCIUM SERPL-MCNC: 8.9 MG/DL (ref 8.7–10.5)
CHLORIDE SERPL-SCNC: 106 MMOL/L (ref 95–110)
CO2 SERPL-SCNC: 30 MMOL/L (ref 23–29)
CREAT SERPL-MCNC: 1.1 MG/DL (ref 0.5–1.4)
CTP QC/QA: YES
DIFFERENTIAL METHOD: NORMAL
EOSINOPHIL # BLD AUTO: 0 K/UL (ref 0–0.5)
EOSINOPHIL NFR BLD: 0.4 % (ref 0–8)
ERYTHROCYTE [DISTWIDTH] IN BLOOD BY AUTOMATED COUNT: 13.7 % (ref 11.5–14.5)
EST. GFR  (NO RACE VARIABLE): >60 ML/MIN/1.73 M^2
GLUCOSE SERPL-MCNC: 106 MG/DL (ref 70–110)
HCT VFR BLD AUTO: 37.4 % (ref 37–48.5)
HGB BLD-MCNC: 12.8 G/DL (ref 12–16)
IMM GRANULOCYTES # BLD AUTO: 0.02 K/UL (ref 0–0.04)
IMM GRANULOCYTES NFR BLD AUTO: 0.2 % (ref 0–0.5)
LYMPHOCYTES # BLD AUTO: 1.9 K/UL (ref 1–4.8)
LYMPHOCYTES NFR BLD: 20.7 % (ref 18–48)
MCH RBC QN AUTO: 30.2 PG (ref 27–31)
MCHC RBC AUTO-ENTMCNC: 34.2 G/DL (ref 32–36)
MCV RBC AUTO: 88 FL (ref 82–98)
MONOCYTES # BLD AUTO: 0.6 K/UL (ref 0.3–1)
MONOCYTES NFR BLD: 5.9 % (ref 4–15)
NEUTROPHILS # BLD AUTO: 6.8 K/UL (ref 1.8–7.7)
NEUTROPHILS NFR BLD: 72.3 % (ref 38–73)
NRBC BLD-RTO: 0 /100 WBC
PLATELET # BLD AUTO: 325 K/UL (ref 150–450)
PMV BLD AUTO: 9.3 FL (ref 9.2–12.9)
POTASSIUM SERPL-SCNC: 3.4 MMOL/L (ref 3.5–5.1)
PROT SERPL-MCNC: 7.4 G/DL (ref 6–8.4)
RBC # BLD AUTO: 4.24 M/UL (ref 4–5.4)
SARS-COV-2 RDRP RESP QL NAA+PROBE: NEGATIVE
SODIUM SERPL-SCNC: 140 MMOL/L (ref 136–145)
WBC # BLD AUTO: 9.35 K/UL (ref 3.9–12.7)

## 2023-09-18 PROCEDURE — 80053 COMPREHEN METABOLIC PANEL: CPT | Performed by: EMERGENCY MEDICINE

## 2023-09-18 PROCEDURE — 87635 SARS-COV-2 COVID-19 AMP PRB: CPT | Performed by: EMERGENCY MEDICINE

## 2023-09-18 PROCEDURE — 85025 COMPLETE CBC W/AUTO DIFF WBC: CPT | Performed by: EMERGENCY MEDICINE

## 2023-09-18 PROCEDURE — 96372 THER/PROPH/DIAG INJ SC/IM: CPT | Performed by: EMERGENCY MEDICINE

## 2023-09-18 PROCEDURE — 36415 COLL VENOUS BLD VENIPUNCTURE: CPT | Performed by: EMERGENCY MEDICINE

## 2023-09-18 PROCEDURE — 99284 EMERGENCY DEPT VISIT MOD MDM: CPT

## 2023-09-18 PROCEDURE — 63600175 PHARM REV CODE 636 W HCPCS: Performed by: EMERGENCY MEDICINE

## 2023-09-18 RX ORDER — DIAZEPAM 5 MG/1
5 TABLET ORAL EVERY 12 HOURS PRN
Qty: 21 TABLET | Refills: 0 | Status: SHIPPED | OUTPATIENT
Start: 2023-09-18 | End: 2023-10-18

## 2023-09-18 RX ORDER — DIAZEPAM 10 MG/2ML
10 INJECTION INTRAMUSCULAR
Status: COMPLETED | OUTPATIENT
Start: 2023-09-18 | End: 2023-09-18

## 2023-09-18 RX ADMIN — DIAZEPAM 10 MG: 5 INJECTION, SOLUTION INTRAMUSCULAR; INTRAVENOUS at 03:09

## 2023-09-18 NOTE — ED PROVIDER NOTES
"Encounter Date: 9/18/2023       History     Chief Complaint   Patient presents with    Numbness     Pt stated that she has been experiencing intermittent left sided facial numbness with "twitching" to legs since last night.      43-year-old female with a history of anxiety presents to the emergency department with a multitude of complaints.  She appears very anxious.  She is tearful.  Complaining of numbness and tingling in her feet as well as her left side of her face.  Pressure on the top of her head.  Legs are twitching.  Denies any chest pain or shortness of breath.  No SI or HI.  Was seen here recently diagnosed with anxiety.      Review of patient's allergies indicates:  No Known Allergies  Past Medical History:   Diagnosis Date    COVID-19 06/15/2020    Vaginal bleeding 03/2022     Past Surgical History:   Procedure Laterality Date    HYSTEROSCOPY WITH HYDROTHERMAL ABLATION OF ENDOMETRIUM WITH DILATION AND CURETTAGE N/A 03/21/2022    Procedure: HYSTEROSCOPY, WITH DILATION AND CURETTAGE OF UTERUS AND HYDROTHERMAL ENDOMETRIAL ABLATION;  Surgeon: Tonia Tabor MD;  Location: [facility];  Service: OB/GYN;  Laterality: N/A;  2  0630    TUBAL LIGATION      twice, became pregnant after 1st tubal "clip came off"     Family History   Problem Relation Age of Onset    No Known Problems Mother     No Known Problems Father     No Known Problems Sister     No Known Problems Daughter     No Known Problems Maternal Aunt     No Known Problems Maternal Uncle     No Known Problems Paternal Aunt     No Known Problems Paternal Uncle     No Known Problems Maternal Grandmother     No Known Problems Maternal Grandfather     No Known Problems Paternal Grandmother     No Known Problems Paternal Grandfather     No Known Problems Brother     No Known Problems Brother     No Known Problems Other     Breast cancer Neg Hx     Ovarian cancer Neg Hx     BRCA 1/2 Neg Hx      Social History     Tobacco Use    Smoking status: Never    Smokeless " tobacco: Never   Substance Use Topics    Alcohol use: Yes     Comment: OCC    Drug use: Never     Review of Systems   Constitutional:  Negative for fever.   HENT:  Negative for sore throat.    Respiratory:  Negative for shortness of breath.    Cardiovascular:  Negative for chest pain.   Gastrointestinal:  Negative for nausea.   Genitourinary:  Negative for dysuria.   Musculoskeletal:  Negative for back pain.   Skin:  Negative for rash.   Neurological:  Negative for weakness.   Hematological:  Does not bruise/bleed easily.   Psychiatric/Behavioral:  The patient is nervous/anxious.    All other systems reviewed and are negative.      Physical Exam     Initial Vitals [09/18/23 1454]   BP Pulse Resp Temp SpO2   (!) 157/96 92 18 98.9 °F (37.2 °C) 99 %      MAP       --         Physical Exam    Nursing note and vitals reviewed.  Constitutional: She appears well-developed and well-nourished. She is not diaphoretic. No distress.   HENT:   Head: Normocephalic and atraumatic.   Mouth/Throat: Oropharynx is clear and moist.   Eyes: Conjunctivae and EOM are normal. Pupils are equal, round, and reactive to light.   Neck: Neck supple. No tracheal deviation present. No JVD present.   Normal range of motion.  Cardiovascular:  Normal rate, regular rhythm, normal heart sounds and intact distal pulses.           No murmur heard.  Pulmonary/Chest: Breath sounds normal. No stridor. No respiratory distress. She has no wheezes. She has no rhonchi. She has no rales. She exhibits no tenderness.   Abdominal: Abdomen is soft. Bowel sounds are normal. She exhibits no distension. There is no abdominal tenderness. There is no rebound and no guarding.   Musculoskeletal:         General: No tenderness or edema. Normal range of motion.      Cervical back: Normal range of motion and neck supple.     Neurological: She is alert and oriented to person, place, and time. She has normal strength. No cranial nerve deficit. GCS score is 15. GCS eye subscore  is 4. GCS verbal subscore is 5. GCS motor subscore is 6.   Skin: Skin is warm and dry. Capillary refill takes less than 2 seconds.   Psychiatric:   Anxious female, no SI, no HI.  Alert oriented x4, GCS is 15         ED Course   Procedures  Labs Reviewed   COMPREHENSIVE METABOLIC PANEL - Abnormal; Notable for the following components:       Result Value    Potassium 3.4 (*)     CO2 30 (*)     AST 9 (*)     All other components within normal limits   CBC W/ AUTO DIFFERENTIAL   SARS-COV-2 RDRP GENE    Narrative:     ..This test utilizes isothermal nucleic acid amplification technology to detect the SARS-CoV-2 RdRp nucleic acid segment. The analytical sensitivity (limit of detection) is 500 copies/swab.     A POSITIVE result is indicative of the presence of SARS-CoV-2 RNA; clinical correlation with patient history and other diagnostic information is necessary to determine patient infection status.    A NEGATIVE result means that SARS-CoV-2 nucleic acids are not present above the limit of detection. A NEGATIVE result should be treated as presumptive. It does not rule out the possibility of COVID-19 and should not be the sole basis for treatment decisions. If COVID-19 is strongly suspected based on clinical and exposure history, re-testing using an alternate molecular assay should be considered.     This test is only for use under the Food and Drug Administration s Emergency Use Authorization (EUA).     Commercial kits are provided by [employer]. Performance characteristics of the EUA have been independently verified by [facility] Department of Pathology and Laboratory Medicine.   _________________________________________________________________   The authorized Fact Sheet for Healthcare Providers and the authorized Fact Sheet for Patients of the ID NOW COVID-19 are available on the FDA website:    https://www.fda.gov/media/569028/download      https://www.fda.gov/media/871518/download               Imaging Results    None          Medications   diazePAM injection 10 mg (10 mg Intramuscular Given 9/18/23 1520)     Medical Decision Making  Amount and/or Complexity of Data Reviewed  Labs: ordered.    Risk  Prescription drug management.               ED Course as of 09/18/23 1606   Mon Sep 18, 2023   1602 Laboratory values are unremarkable.  Stable for discharge and follow-up. 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 Provider    diazePAM (VALIUM) 5 MG tablet Take 1 tablet (5 mg total) by mouth every 12 (twelve) hours as needed for Anxiety. 21 tablet 9/18/2023 10/18/2023 Jose De Jesus Serrano MD          Follow-up Information       Follow up With Specialties Details Why Contact Info Additional Information    Primary care physician  In 2 days       [facility] Emergency Medicine  As needed [address] [id number] [phone] Floor 1    [facility]  In 1 day  [address] [facility] [address]  [phone]                Jose De Jesus Serrano MD  09/18/23 1605

## 2023-09-21 ENCOUNTER — PATIENT OUTREACH (OUTPATIENT)
Dept: EMERGENCY MEDICINE | Facility: HOSPITAL | Age: 43
End: 2023-09-21
Payer: MEDICAID

## 2023-09-25 NOTE — PROGRESS NOTES
ED Navigator attempted to contact patient on 3 or more separate occasions, patient is unable to reach. ED Navigator to close encounter at this time.    Rashmi Byrne  ED Navigator- [place]  [phone]

## 2024-01-08 ENCOUNTER — HOSPITAL ENCOUNTER (OUTPATIENT)
Dept: RADIOLOGY | Facility: HOSPITAL | Age: 44
Discharge: HOME | End: 2024-01-08
Attending: NURSE PRACTITIONER
Payer: MEDICAID

## 2024-01-08 DIAGNOSIS — M54.42 ACUTE BACK PAIN WITH SCIATICA, LEFT: ICD-10-CM

## 2024-01-08 DIAGNOSIS — M54.42 ACUTE BACK PAIN WITH SCIATICA, LEFT: Primary | ICD-10-CM

## 2024-01-08 DIAGNOSIS — G62.9 PERIPHERAL NERVE DISORDER: ICD-10-CM

## 2024-01-08 PROCEDURE — 72100 X-RAY EXAM L-S SPINE 2/3 VWS: CPT | Mod: TC

## 2024-04-28 ENCOUNTER — HOSPITAL ENCOUNTER (EMERGENCY)
Facility: HOSPITAL | Age: 44
Discharge: HOME | End: 2024-04-28
Attending: EMERGENCY MEDICINE
Payer: MEDICAID

## 2024-04-28 VITALS
OXYGEN SATURATION: 98 % | BODY MASS INDEX: 28.56 KG/M2 | SYSTOLIC BLOOD PRESSURE: 125 MMHG | WEIGHT: 182 LBS | HEIGHT: 67 IN | RESPIRATION RATE: 18 BRPM | TEMPERATURE: 98 F | DIASTOLIC BLOOD PRESSURE: 76 MMHG | HEART RATE: 68 BPM

## 2024-04-28 DIAGNOSIS — R19.00 ABDOMINAL WALL MASS OF RIGHT FLANK: Primary | ICD-10-CM

## 2024-04-28 PROCEDURE — 99281 EMR DPT VST MAYX REQ PHY/QHP: CPT

## 2024-04-28 NOTE — DISCHARGE INSTRUCTIONS
Consult placed for dermatology to determine lipoma versus cyst which are all dealt with by dermatology.  If did not receive a phone call in the next few days can call the other dermatologist listed above.

## 2024-04-28 NOTE — ED PROVIDER NOTES
"Encounter Date: 4/28/2024       History     Chief Complaint   Patient presents with    Mass     Pt c/o small lump to R underarm/upper back area xmonths. Recently started with pain there.      43-year-old female presents emergency room with large bump to right flank area that has been there for months which she felt was an emergency today.  Reports pain with movement and palpation.  Declined pain medication.  Instructed patient that mass is either a lipoma or epididymal cyst.  Instructed patient we do not handle those issues here in the emergency room she will have to follow-up with dermatology in which I will put a consult in        Review of patient's allergies indicates:  No Known Allergies  Past Medical History:   Diagnosis Date    COVID-19 06/15/2020    Vaginal bleeding 03/2022     Past Surgical History:   Procedure Laterality Date    HYSTEROSCOPY WITH HYDROTHERMAL ABLATION OF ENDOMETRIUM WITH DILATION AND CURETTAGE N/A 03/21/2022    Procedure: HYSTEROSCOPY, WITH DILATION AND CURETTAGE OF UTERUS AND HYDROTHERMAL ENDOMETRIAL ABLATION;  Surgeon: Tonia Tabor MD;  Location: [facility];  Service: OB/GYN;  Laterality: N/A;  2  0630    TUBAL LIGATION      twice, became pregnant after 1st tubal "clip came off"     Family History   Problem Relation Name Age of Onset    No Known Problems Mother      No Known Problems Father      No Known Problems Sister      No Known Problems Daughter      No Known Problems Maternal Aunt      No Known Problems Maternal Uncle      No Known Problems Paternal Aunt      No Known Problems Paternal Uncle      No Known Problems Maternal Grandmother      No Known Problems Maternal Grandfather      No Known Problems Paternal Grandmother      No Known Problems Paternal Grandfather      No Known Problems Brother      No Known Problems Brother      No Known Problems Other      Breast cancer Neg Hx      Ovarian cancer Neg Hx      BRCA 1/2 Neg Hx       Social History     Tobacco Use    Smoking status: " Never    Smokeless tobacco: Never   Substance Use Topics    Alcohol use: Yes     Comment: OCC    Drug use: Never     Review of Systems   Constitutional:  Negative for fever.   HENT:  Negative for sore throat.    Respiratory:  Negative for shortness of breath.    Cardiovascular:  Negative for chest pain.   Gastrointestinal:  Negative for nausea.   Genitourinary:  Negative for dysuria.   Musculoskeletal:  Negative for back pain.   Skin:  Negative for rash.   Neurological:  Negative for weakness.   Hematological:  Does not bruise/bleed easily.   All other systems reviewed and are negative.      Physical Exam     Initial Vitals [04/28/24 1243]   BP Pulse Resp Temp SpO2   (!) 146/95 65 18 98.3 °F (36.8 °C) 97 %      MAP       --         Physical Exam    Nursing note and vitals reviewed.  Constitutional: She appears well-developed and well-nourished.   HENT:   Head: Normocephalic and atraumatic.   Eyes: Pupils are equal, round, and reactive to light.   Neck:   Normal range of motion.  Musculoskeletal:         General: Normal range of motion.      Cervical back: Normal range of motion.     Neurological: She is alert and oriented to person, place, and time.   Skin: Skin is warm and dry.   Localized swelling noted to right flank area that is tender on palpation   Psychiatric: She has a normal mood and affect.         ED Course   Procedures  Labs Reviewed - No data to display       Imaging Results    None          Medications - No data to display  Medical Decision Making                                    Clinical Impression:  Final diagnoses:  [R19.00] Abdominal wall mass of right flank (Primary)          ED Disposition Condition    Discharge Stable          ED Prescriptions    None       Follow-up Information       Follow up With Specialties Details Why Contact Info    Chaim Zazueta MD Internal Medicine  As needed [address] [place] [address] [address]  [facility]  [facility]  [id number]  [phone]      [facility]  Schedule an appointment as soon as possible for a visit   [address] [facility] [address]  [phone]               Colette Whittaker, NP  04/28/24 1530

## 2024-05-01 ENCOUNTER — HOSPITAL ENCOUNTER (OUTPATIENT)
Dept: RADIOLOGY | Facility: HOSPITAL | Age: 44
Discharge: HOME | End: 2024-05-01
Attending: INTERNAL MEDICINE
Payer: MEDICAID

## 2024-05-01 VITALS — WEIGHT: 182 LBS | HEIGHT: 67 IN | BODY MASS INDEX: 28.56 KG/M2

## 2024-05-01 DIAGNOSIS — Z12.31 SCREENING MAMMOGRAM FOR BREAST CANCER: ICD-10-CM

## 2024-05-01 PROCEDURE — 77063 BREAST TOMOSYNTHESIS BI: CPT | Mod: TC

## 2024-05-01 PROCEDURE — 77067 SCR MAMMO BI INCL CAD: CPT | Mod: TC

## 2024-05-15 ENCOUNTER — OFFICE VISIT (OUTPATIENT)
Dept: OBSTETRICS AND GYNECOLOGY | Facility: CLINIC | Age: 44
End: 2024-05-15
Payer: MEDICAID

## 2024-05-15 VITALS
WEIGHT: 176 LBS | BODY MASS INDEX: 27.62 KG/M2 | HEART RATE: 74 BPM | DIASTOLIC BLOOD PRESSURE: 64 MMHG | SYSTOLIC BLOOD PRESSURE: 105 MMHG | HEIGHT: 67 IN

## 2024-05-15 DIAGNOSIS — Z11.3 SCREENING EXAMINATION FOR VENEREAL DISEASE: ICD-10-CM

## 2024-05-15 DIAGNOSIS — N93.0 POSTCOITAL BLEEDING: Primary | ICD-10-CM

## 2024-05-15 DIAGNOSIS — Z98.890 HISTORY OF ENDOMETRIAL ABLATION: ICD-10-CM

## 2024-05-15 DIAGNOSIS — N86 CERVICAL ECTROPION: ICD-10-CM

## 2024-05-15 PROCEDURE — 1159F MED LIST DOCD IN RCRD: CPT | Mod: CPTII,,, | Performed by: OBSTETRICS & GYNECOLOGY

## 2024-05-15 PROCEDURE — 3078F DIAST BP <80 MM HG: CPT | Mod: CPTII,,, | Performed by: OBSTETRICS & GYNECOLOGY

## 2024-05-15 PROCEDURE — 1160F RVW MEDS BY RX/DR IN RCRD: CPT | Mod: CPTII,,, | Performed by: OBSTETRICS & GYNECOLOGY

## 2024-05-15 PROCEDURE — 99213 OFFICE O/P EST LOW 20 MIN: CPT | Mod: S$PBB,,, | Performed by: OBSTETRICS & GYNECOLOGY

## 2024-05-15 PROCEDURE — 99213 OFFICE O/P EST LOW 20 MIN: CPT | Mod: PBBFAC | Performed by: OBSTETRICS & GYNECOLOGY

## 2024-05-15 PROCEDURE — 87661 TRICHOMONAS VAGINALIS AMPLIF: CPT | Performed by: OBSTETRICS & GYNECOLOGY

## 2024-05-15 PROCEDURE — 99999 PR PBB SHADOW E&M-EST. PATIENT-LVL III: CPT | Mod: PBBFAC,,, | Performed by: OBSTETRICS & GYNECOLOGY

## 2024-05-15 PROCEDURE — 3008F BODY MASS INDEX DOCD: CPT | Mod: CPTII,,, | Performed by: OBSTETRICS & GYNECOLOGY

## 2024-05-15 PROCEDURE — 3074F SYST BP LT 130 MM HG: CPT | Mod: CPTII,,, | Performed by: OBSTETRICS & GYNECOLOGY

## 2024-05-15 RX ORDER — CHOLECALCIFEROL (VITAMIN D3) 1250 MCG
50000 CAPSULE ORAL
COMMUNITY
Start: 2024-01-29

## 2024-05-15 NOTE — PROGRESS NOTES
Subjective:    Patient ID: Cliff Marroquin is a 43 y.o. y.o. female    Chief Complaint:   Chief Complaint   Patient presents with    bleeding during intercourse       History of Present Illness:  Cliff presents today for evaluation of bleeding with intercourse.  Patient states that the bleeding occurs at the same time as intercourse and really does not occur after.  No intermenstrual bleeding noted.  She states these symptoms have been occurring for about 2 months      Review of Systems   Constitutional:  Negative for chills and fever.   Respiratory:  Negative for shortness of breath.    Cardiovascular:  Negative for chest pain.   Gastrointestinal:  Negative for constipation.   Genitourinary:  Positive for postcoital bleeding. Negative for dysuria.   Neurological:  Negative for headaches.         Objective:    Vital Signs:  Vitals:    05/15/24 1117   BP: 105/64   Pulse: 74     Wt Readings from Last 1 Encounters:   05/15/24 79.8 kg (176 lb)     Body mass index is 27.57 kg/m².    Physical Exam:  General:  alert, no distress   Pelvis: External genitalia: normal general appearance  Urinary system: urethral meatus normal, bladder nontender  Vaginal: normal mucosa without prolapse or lesions  Cervix: ectropian, cauterized with silver nitrate       Explained cervical ectropion and its role in postcoital bleeding.  All questions answered    I spent a total of 20 minutes on the day of the visit.  This includes face to face time and non-face to face time preparing to see the patient (eg, review of tests), obtaining and/or reviewing separately obtained history, documenting clinical information in the electronic or other health record, independently interpreting results and communicating results to the patient/family/caregiver, or care coordinator.           Assessment:      1. Postcoital bleeding    2. History of endometrial ablation    3. Cervical ectropion    4. Screening examination for venereal disease          Plan:       Postcoital bleeding  -     C. trachomatis/N. gonorrhoeae by AMP DNA [facility]; Cervicovaginal    History of endometrial ablation    Cervical ectropion    Screening examination for venereal disease  -     C. trachomatis/N. gonorrhoeae by AMP DNA [facility]; Cervicovaginal           Tonia Tabor MD, FACOG   05/15/2024 11:35 AM

## 2024-05-23 LAB
CHLAMYDIA/N. GONORROHEAE AMP DNA: NORMAL
TRICHOMONAS VAGINALIS, DNA, URINE: NORMAL

## 2024-05-29 ENCOUNTER — OFFICE VISIT (OUTPATIENT)
Dept: OBSTETRICS AND GYNECOLOGY | Facility: CLINIC | Age: 44
End: 2024-05-29
Payer: MEDICAID

## 2024-05-29 VITALS
HEIGHT: 67 IN | WEIGHT: 181 LBS | BODY MASS INDEX: 28.41 KG/M2 | HEART RATE: 70 BPM | DIASTOLIC BLOOD PRESSURE: 69 MMHG | SYSTOLIC BLOOD PRESSURE: 114 MMHG

## 2024-05-29 DIAGNOSIS — Z30.013 ENCOUNTER FOR INITIAL PRESCRIPTION OF INJECTABLE CONTRACEPTIVE: ICD-10-CM

## 2024-05-29 DIAGNOSIS — Z32.02 PREGNANCY TEST NEGATIVE: ICD-10-CM

## 2024-05-29 DIAGNOSIS — Z30.013 ENCOUNTER FOR PRESCRIPTION FOR DEPO-PROVERA: ICD-10-CM

## 2024-05-29 DIAGNOSIS — Z98.890 HISTORY OF ENDOMETRIAL ABLATION: ICD-10-CM

## 2024-05-29 DIAGNOSIS — N93.0 POSTCOITAL BLEEDING: Primary | ICD-10-CM

## 2024-05-29 DIAGNOSIS — R93.89 THICKENED ENDOMETRIUM: ICD-10-CM

## 2024-05-29 LAB
B-HCG UR QL: NEGATIVE
CTP QC/QA: YES

## 2024-05-29 PROCEDURE — 99999PBSHW POCT URINE PREGNANCY: Mod: PBBFAC,,,

## 2024-05-29 PROCEDURE — 96372 THER/PROPH/DIAG INJ SC/IM: CPT | Mod: PBBFAC

## 2024-05-29 PROCEDURE — 99999 PR PBB SHADOW E&M-EST. PATIENT-LVL III: CPT | Mod: PBBFAC,,, | Performed by: OBSTETRICS & GYNECOLOGY

## 2024-05-29 PROCEDURE — 3074F SYST BP LT 130 MM HG: CPT | Mod: CPTII,,, | Performed by: OBSTETRICS & GYNECOLOGY

## 2024-05-29 PROCEDURE — 3008F BODY MASS INDEX DOCD: CPT | Mod: CPTII,,, | Performed by: OBSTETRICS & GYNECOLOGY

## 2024-05-29 PROCEDURE — 3078F DIAST BP <80 MM HG: CPT | Mod: CPTII,,, | Performed by: OBSTETRICS & GYNECOLOGY

## 2024-05-29 PROCEDURE — 81025 URINE PREGNANCY TEST: CPT | Mod: PBBFAC | Performed by: OBSTETRICS & GYNECOLOGY

## 2024-05-29 PROCEDURE — 99999PBSHW PR PBB SHADOW TECHNICAL ONLY FILED TO HB: Mod: PBBFAC,,,

## 2024-05-29 PROCEDURE — 1159F MED LIST DOCD IN RCRD: CPT | Mod: CPTII,,, | Performed by: OBSTETRICS & GYNECOLOGY

## 2024-05-29 PROCEDURE — 99213 OFFICE O/P EST LOW 20 MIN: CPT | Mod: S$PBB,,, | Performed by: OBSTETRICS & GYNECOLOGY

## 2024-05-29 PROCEDURE — 99213 OFFICE O/P EST LOW 20 MIN: CPT | Mod: PBBFAC | Performed by: OBSTETRICS & GYNECOLOGY

## 2024-05-29 PROCEDURE — 1160F RVW MEDS BY RX/DR IN RCRD: CPT | Mod: CPTII,,, | Performed by: OBSTETRICS & GYNECOLOGY

## 2024-05-29 RX ORDER — MEDROXYPROGESTERONE ACETATE 150 MG/ML
150 INJECTION, SUSPENSION INTRAMUSCULAR
Status: COMPLETED | OUTPATIENT
Start: 2024-05-29 | End: 2024-05-29

## 2024-05-29 RX ADMIN — MEDROXYPROGESTERONE ACETATE 150 MG: 150 INJECTION, SUSPENSION INTRAMUSCULAR at 10:05

## 2024-05-29 NOTE — PROGRESS NOTES
Depo provera given to patient in the right deltoid. Tolerated well. Ambulatory upon exit with no new complaints.

## 2024-05-29 NOTE — PROGRESS NOTES
Subjective:    Patient ID: Cliff Marroquin is a 43 y.o. y.o. female    Chief Complaint:   Chief Complaint   Patient presents with    bleeding with intercourse       History of Present Illness:  Cliff presents today for follow up of bleeding with intercourse.  Patient states that she feels that the bleeding has gotten worse.  Last visit she had cervical ectropion noted and it was cauterized with silver nitrate.      Review of Systems   Constitutional:  Negative for chills and fever.   Respiratory:  Negative for shortness of breath.    Cardiovascular:  Negative for chest pain.   Gastrointestinal:  Negative for constipation.   Genitourinary:  Positive for vaginal bleeding and postcoital bleeding.   Neurological:  Negative for headaches.         Objective:    Vital Signs:  Vitals:    05/29/24 0940   BP: 114/69   Pulse: 70     Wt Readings from Last 1 Encounters:   05/29/24 82.1 kg (181 lb)     Body mass index is 28.35 kg/m².    Physical Exam:  General:  alert, no distress   Skin:  Skin color, texture, turgor normal. No rashes or lesions   Abdomen:  Soft, nontender   Extremities: No cyanosis, clubbing, edema       UPT: Negative     Ultrasound: Endometrial lining 1.3 cm (thickened).  Right lateral fibroid noted measuring 2.9 x 2.3 x 2.6 cm.  Left ovarian cyst measuring 2.0 x 2.3 x 2.1 cm.  No free fluid noted    Discussed options for treatment of thickened endometrium and bleeding with intercourse.  She is wanting to restart birth control and would like to take the Depo-Provera injection as it helped control her cycles in the past.  I explained that this medication should help with the bleeding.  I also explained that she will have some episodes of possibly heavy vaginal bleeding as the thickened lining is shed due to the effects of the progesterone in the Depo-Provera injection.  Patient understands and all questions answered Depo-Provera injection given today    I spent a total of 20 minutes on the day of the  visit.  This includes face to face time and non-face to face time preparing to see the patient (eg, review of tests), obtaining and/or reviewing separately obtained history, documenting clinical information in the electronic or other health record, independently interpreting results and communicating results to the patient/family/caregiver, or care coordinator.           Assessment:      1. Postcoital bleeding    2. History of endometrial ablation    3. Encounter for prescription for depo-Provera    4. Encounter for initial prescription of injectable contraceptive    5. Thickened endometrium    6. Pregnancy test negative          Plan:      Postcoital bleeding    History of endometrial ablation    Encounter for prescription for depo-Provera  -     Prior authorization Order  -     medroxyPROGESTERone (DEPO-PROVERA) injection 150 mg    Encounter for initial prescription of injectable contraceptive  -     POCT urine pregnancy    Thickened endometrium    Pregnancy test negative         Tonia Tabor MD, FACOG   05/29/2024 9:49 AM

## 2024-08-02 ENCOUNTER — TELEPHONE (OUTPATIENT)
Dept: OBSTETRICS AND GYNECOLOGY | Facility: CLINIC | Age: 44
End: 2024-08-02
Payer: MEDICAID

## 2024-08-02 DIAGNOSIS — Z30.45 ENCOUNTER FOR SURVEILLANCE OF TRANSDERMAL PATCH HORMONAL CONTRACEPTIVE DEVICE: Primary | ICD-10-CM

## 2024-08-05 RX ORDER — NORELGESTROMIN AND ETHINYL ESTRADIOL 35; 150 UG/MG; UG/MG
PATCH TRANSDERMAL
Qty: 9 PATCH | Refills: 4 | Status: SHIPPED | OUTPATIENT
Start: 2024-08-05

## 2024-08-30 ENCOUNTER — CLINICAL SUPPORT (OUTPATIENT)
Dept: OBSTETRICS AND GYNECOLOGY | Facility: CLINIC | Age: 44
End: 2024-08-30
Payer: MEDICAID

## 2024-08-30 DIAGNOSIS — Z30.45 ENCOUNTER FOR SURVEILLANCE OF TRANSDERMAL PATCH HORMONAL CONTRACEPTIVE DEVICE: ICD-10-CM

## 2024-08-30 DIAGNOSIS — Z30.42 SURVEILLANCE FOR DEPO-PROVERA CONTRACEPTION: Primary | ICD-10-CM

## 2024-08-30 RX ORDER — MEDROXYPROGESTERONE ACETATE 150 MG/ML
150 INJECTION, SUSPENSION INTRAMUSCULAR
Status: COMPLETED | OUTPATIENT
Start: 2024-08-30 | End: 2024-08-30

## 2024-08-30 RX ORDER — NORELGESTROMIN AND ETHINYL ESTRADIOL 35; 150 UG/MG; UG/MG
PATCH TRANSDERMAL
Qty: 9 PATCH | Refills: 4 | Status: SHIPPED | OUTPATIENT
Start: 2024-08-30

## 2024-08-30 RX ADMIN — MEDROXYPROGESTERONE ACETATE 150 MG: 150 INJECTION, SUSPENSION INTRAMUSCULAR at 08:08

## 2024-08-30 NOTE — PROGRESS NOTES
Patient here for Depo Provera 150 mg/mL injection. Injection given in left deltoid. Patient tolerated well and ambulated out of clinic.

## 2024-10-07 DIAGNOSIS — Z30.014 ENCOUNTER FOR INITIAL PRESCRIPTION OF INTRAUTERINE CONTRACEPTIVE DEVICE (IUD): Primary | ICD-10-CM

## 2024-10-30 ENCOUNTER — PROCEDURE VISIT (OUTPATIENT)
Dept: OBSTETRICS AND GYNECOLOGY | Facility: CLINIC | Age: 44
End: 2024-10-30
Payer: MEDICAID

## 2024-10-30 VITALS
WEIGHT: 181 LBS | BODY MASS INDEX: 28.41 KG/M2 | DIASTOLIC BLOOD PRESSURE: 72 MMHG | HEART RATE: 67 BPM | HEIGHT: 67 IN | SYSTOLIC BLOOD PRESSURE: 123 MMHG

## 2024-10-30 DIAGNOSIS — N92.1 MENORRHAGIA WITH IRREGULAR CYCLE: Primary | ICD-10-CM

## 2024-10-30 DIAGNOSIS — Z30.9 ENCOUNTER FOR CONTRACEPTIVE MANAGEMENT, UNSPECIFIED TYPE: ICD-10-CM

## 2024-10-30 DIAGNOSIS — Z30.430 ENCOUNTER FOR IUD INSERTION: ICD-10-CM

## 2024-10-30 DIAGNOSIS — Z32.02 PREGNANCY TEST NEGATIVE: ICD-10-CM

## 2024-10-30 LAB
B-HCG UR QL: NEGATIVE
CTP QC/QA: YES

## 2024-10-30 PROCEDURE — 99999PBSHW PR PBB SHADOW TECHNICAL ONLY FILED TO HB: Mod: PBBFAC,,,

## 2024-10-30 PROCEDURE — 58300 INSERT INTRAUTERINE DEVICE: CPT | Mod: PBBFAC | Performed by: OBSTETRICS & GYNECOLOGY

## 2024-10-30 PROCEDURE — 99999PBSHW POCT URINE PREGNANCY: Mod: PBBFAC,,,

## 2024-10-30 PROCEDURE — 81025 URINE PREGNANCY TEST: CPT | Mod: PBBFAC | Performed by: OBSTETRICS & GYNECOLOGY

## 2024-10-30 RX ADMIN — LEVONORGESTREL 1 INTRA UTERINE DEVICE: 52 INTRAUTERINE DEVICE INTRAUTERINE at 08:10

## 2024-12-03 ENCOUNTER — OFFICE VISIT (OUTPATIENT)
Dept: OBSTETRICS AND GYNECOLOGY | Facility: CLINIC | Age: 44
End: 2024-12-03
Payer: MEDICAID

## 2024-12-03 ENCOUNTER — APPOINTMENT (OUTPATIENT)
Dept: OBSTETRICS AND GYNECOLOGY | Facility: CLINIC | Age: 44
End: 2024-12-03
Payer: MEDICAID

## 2024-12-03 VITALS
WEIGHT: 180 LBS | DIASTOLIC BLOOD PRESSURE: 70 MMHG | SYSTOLIC BLOOD PRESSURE: 130 MMHG | HEIGHT: 67 IN | BODY MASS INDEX: 28.25 KG/M2

## 2024-12-03 DIAGNOSIS — Z30.431 IUD CHECK UP: Primary | ICD-10-CM

## 2024-12-03 DIAGNOSIS — Z97.5 IUD (INTRAUTERINE DEVICE) IN PLACE: ICD-10-CM

## 2024-12-03 DIAGNOSIS — N92.1 MENORRHAGIA WITH IRREGULAR CYCLE: Primary | ICD-10-CM

## 2024-12-03 DIAGNOSIS — Z98.890 HISTORY OF ENDOMETRIAL ABLATION: ICD-10-CM

## 2024-12-03 DIAGNOSIS — N93.0 POSTCOITAL BLEEDING: ICD-10-CM

## 2024-12-03 PROCEDURE — 3078F DIAST BP <80 MM HG: CPT | Mod: CPTII,,, | Performed by: OBSTETRICS & GYNECOLOGY

## 2024-12-03 PROCEDURE — 99213 OFFICE O/P EST LOW 20 MIN: CPT | Mod: S$PBB,,, | Performed by: OBSTETRICS & GYNECOLOGY

## 2024-12-03 PROCEDURE — 3008F BODY MASS INDEX DOCD: CPT | Mod: CPTII,,, | Performed by: OBSTETRICS & GYNECOLOGY

## 2024-12-03 PROCEDURE — 76856 US EXAM PELVIC COMPLETE: CPT | Mod: PBBFAC | Performed by: OBSTETRICS & GYNECOLOGY

## 2024-12-03 PROCEDURE — 99213 OFFICE O/P EST LOW 20 MIN: CPT | Mod: PBBFAC,25 | Performed by: OBSTETRICS & GYNECOLOGY

## 2024-12-03 PROCEDURE — 99999 PR PBB SHADOW E&M-EST. PATIENT-LVL III: CPT | Mod: PBBFAC,,, | Performed by: OBSTETRICS & GYNECOLOGY

## 2024-12-03 PROCEDURE — 3075F SYST BP GE 130 - 139MM HG: CPT | Mod: CPTII,,, | Performed by: OBSTETRICS & GYNECOLOGY

## 2024-12-03 NOTE — PROGRESS NOTES
Subjective:    Patient ID: Cliff Marroquin is a 44 y.o. y.o. female    Chief Complaint: No chief complaint on file.      History of Present Illness:  Cliff presents today for discussion of continued bleeding.  Patient reports that she still has bleeding with sex.  She states that the bleeding occurs for a couple of days after.  Otherwise the bleeding is under some control.  She desires more definitive management and is ready for hysterectomy since the endometrial ablation did not work.    IUD placed on 10/30/2024      Review of Systems   Constitutional:  Negative for chills and fever.   Respiratory:  Negative for shortness of breath.    Cardiovascular:  Negative for chest pain.   Gastrointestinal:  Negative for constipation.   Genitourinary:  Positive for menstrual problem and postcoital bleeding.   Neurological:  Negative for headaches.         Objective:    Vital Signs:  Vitals:    12/03/24 0902   BP: 130/70     Wt Readings from Last 1 Encounters:   12/03/24 81.6 kg (180 lb)     Body mass index is 28.19 kg/m².    Physical Exam:  General:  alert, no distress   Skin:  Skin color, texture, turgor normal. No rashes or lesions   Abdomen:  Soft, nontender   Extremities: No cyanosis, clubbing, edema       Ultrasound:  IUD position within normal limits.  Uterine fibroid noted-size stable    Discussed ultrasound findings and given her symptoms and continued bleeding will schedule for hysterectomy as she desires definitive management.  All questions answered    I spent a total of 20 minutes on the day of the visit.  This includes face to face time and non-face to face time preparing to see the patient (eg, review of tests), obtaining and/or reviewing separately obtained history, documenting clinical information in the electronic or other health record, independently interpreting results and communicating results to the patient/family/caregiver, or care coordinator.           Assessment:      1. Menorrhagia with  irregular cycle    2. Postcoital bleeding    3. History of endometrial ablation    4. IUD (intrauterine device) in place          Plan:      Menorrhagia with irregular cycle  -     Case Request Operating Room: HYSTERECTOMY,TOTAL,LAPAROSCOPIC,WITH SALPINGECTOMY    Postcoital bleeding  -     Case Request Operating Room: HYSTERECTOMY,TOTAL,LAPAROSCOPIC,WITH SALPINGECTOMY    History of endometrial ablation  -     Case Request Operating Room: HYSTERECTOMY,TOTAL,LAPAROSCOPIC,WITH SALPINGECTOMY    IUD (intrauterine device) in place  -     Case Request Operating Room: HYSTERECTOMY,TOTAL,LAPAROSCOPIC,WITH SALPINGECTOMY           Tonia Tabor MD, FACOG   12/03/2024 8:51 AM

## 2024-12-30 ENCOUNTER — HOSPITAL ENCOUNTER (OUTPATIENT)
Dept: RADIOLOGY | Facility: HOSPITAL | Age: 44
Discharge: HOME | End: 2024-12-30
Attending: OBSTETRICS & GYNECOLOGY
Payer: MEDICAID

## 2024-12-30 ENCOUNTER — HOSPITAL ENCOUNTER (OUTPATIENT)
Dept: PULMONOLOGY | Facility: HOSPITAL | Age: 44
Discharge: HOME | End: 2024-12-30
Attending: OBSTETRICS & GYNECOLOGY
Payer: MEDICAID

## 2024-12-30 ENCOUNTER — OFFICE VISIT (OUTPATIENT)
Dept: OBSTETRICS AND GYNECOLOGY | Facility: CLINIC | Age: 44
End: 2024-12-30
Payer: MEDICAID

## 2024-12-30 ENCOUNTER — HOSPITAL ENCOUNTER (OUTPATIENT)
Dept: PREADMISSION TESTING | Facility: HOSPITAL | Age: 44
Discharge: HOME | End: 2024-12-30
Attending: OBSTETRICS & GYNECOLOGY
Payer: MEDICAID

## 2024-12-30 VITALS
HEIGHT: 67 IN | WEIGHT: 185 LBS | HEIGHT: 67 IN | BODY MASS INDEX: 29.03 KG/M2 | BODY MASS INDEX: 29.03 KG/M2 | SYSTOLIC BLOOD PRESSURE: 137 MMHG | WEIGHT: 185 LBS | HEART RATE: 96 BPM | DIASTOLIC BLOOD PRESSURE: 65 MMHG

## 2024-12-30 DIAGNOSIS — Z98.890 HISTORY OF ENDOMETRIAL ABLATION: ICD-10-CM

## 2024-12-30 DIAGNOSIS — Z97.5 IUD (INTRAUTERINE DEVICE) IN PLACE: ICD-10-CM

## 2024-12-30 DIAGNOSIS — N92.1 MENORRHAGIA WITH IRREGULAR CYCLE: Primary | ICD-10-CM

## 2024-12-30 DIAGNOSIS — Z01.818 PREOPERATIVE EXAM FOR GYNECOLOGIC SURGERY: ICD-10-CM

## 2024-12-30 DIAGNOSIS — N92.1 MENORRHAGIA WITH IRREGULAR CYCLE: ICD-10-CM

## 2024-12-30 LAB
OHS QRS DURATION: 82 MS
OHS QTC CALCULATION: 417 MS

## 2024-12-30 PROCEDURE — 71046 X-RAY EXAM CHEST 2 VIEWS: CPT | Mod: TC

## 2024-12-30 PROCEDURE — 93005 ELECTROCARDIOGRAM TRACING: CPT

## 2024-12-30 PROCEDURE — 93010 ELECTROCARDIOGRAM REPORT: CPT | Mod: ,,, | Performed by: INTERNAL MEDICINE

## 2024-12-30 PROCEDURE — 99213 OFFICE O/P EST LOW 20 MIN: CPT | Mod: PBBFAC | Performed by: OBSTETRICS & GYNECOLOGY

## 2024-12-30 PROCEDURE — 99999 PR PBB SHADOW E&M-EST. PATIENT-LVL III: CPT | Mod: PBBFAC,,, | Performed by: OBSTETRICS & GYNECOLOGY

## 2024-12-30 RX ORDER — DIPHENHYDRAMINE HYDROCHLORIDE 50 MG/ML
25 INJECTION INTRAMUSCULAR; INTRAVENOUS EVERY 4 HOURS PRN
OUTPATIENT
Start: 2024-12-30

## 2024-12-30 RX ORDER — KETOROLAC TROMETHAMINE 30 MG/ML
30 INJECTION, SOLUTION INTRAMUSCULAR; INTRAVENOUS
OUTPATIENT
Start: 2024-12-30 | End: 2024-12-31

## 2024-12-30 RX ORDER — MEPERIDINE HYDROCHLORIDE 25 MG/ML
12.5 INJECTION INTRAMUSCULAR; INTRAVENOUS; SUBCUTANEOUS ONCE AS NEEDED
OUTPATIENT
Start: 2024-12-30 | End: 2024-12-31

## 2024-12-30 RX ORDER — ACETAMINOPHEN 325 MG/1
650 TABLET ORAL EVERY 4 HOURS PRN
OUTPATIENT
Start: 2024-12-30

## 2024-12-30 RX ORDER — POLYETHYLENE GLYCOL 3350 17 G/17G
17 POWDER, FOR SOLUTION ORAL DAILY
OUTPATIENT
Start: 2024-12-30

## 2024-12-30 RX ORDER — ONDANSETRON HYDROCHLORIDE 2 MG/ML
4 INJECTION, SOLUTION INTRAVENOUS DAILY PRN
OUTPATIENT
Start: 2024-12-30

## 2024-12-30 RX ORDER — IBUPROFEN 800 MG/1
800 TABLET ORAL
OUTPATIENT
Start: 2024-12-31

## 2024-12-30 RX ORDER — MUPIROCIN 20 MG/G
OINTMENT TOPICAL
OUTPATIENT
Start: 2024-12-30

## 2024-12-30 RX ORDER — PROCHLORPERAZINE EDISYLATE 5 MG/ML
5 INJECTION INTRAMUSCULAR; INTRAVENOUS EVERY 10 MIN PRN
OUTPATIENT
Start: 2024-12-30

## 2024-12-30 RX ORDER — ONDANSETRON 4 MG/1
8 TABLET, ORALLY DISINTEGRATING ORAL EVERY 8 HOURS PRN
OUTPATIENT
Start: 2024-12-30

## 2024-12-30 RX ORDER — SODIUM CHLORIDE, SODIUM LACTATE, POTASSIUM CHLORIDE, CALCIUM CHLORIDE 600; 310; 30; 20 MG/100ML; MG/100ML; MG/100ML; MG/100ML
INJECTION, SOLUTION INTRAVENOUS CONTINUOUS
OUTPATIENT
Start: 2024-12-30

## 2024-12-30 RX ORDER — LIDOCAINE HYDROCHLORIDE 10 MG/ML
0.5 INJECTION, SOLUTION EPIDURAL; INFILTRATION; INTRACAUDAL; PERINEURAL
OUTPATIENT
Start: 2024-12-30

## 2024-12-30 RX ORDER — PROCHLORPERAZINE EDISYLATE 5 MG/ML
5 INJECTION INTRAMUSCULAR; INTRAVENOUS EVERY 6 HOURS PRN
OUTPATIENT
Start: 2024-12-30

## 2024-12-30 RX ORDER — CEFAZOLIN SODIUM 2 G/50ML
2 SOLUTION INTRAVENOUS
OUTPATIENT
Start: 2024-12-30

## 2024-12-30 RX ORDER — HYDROMORPHONE HYDROCHLORIDE 1 MG/ML
1 INJECTION, SOLUTION INTRAMUSCULAR; INTRAVENOUS; SUBCUTANEOUS EVERY 4 HOURS PRN
OUTPATIENT
Start: 2024-12-30

## 2024-12-30 RX ORDER — HYDROCODONE BITARTRATE AND ACETAMINOPHEN 10; 325 MG/1; MG/1
1 TABLET ORAL EVERY 4 HOURS PRN
OUTPATIENT
Start: 2024-12-30

## 2024-12-30 RX ORDER — DIPHENHYDRAMINE HCL 25 MG
25 CAPSULE ORAL EVERY 4 HOURS PRN
OUTPATIENT
Start: 2024-12-30

## 2024-12-30 RX ORDER — FAMOTIDINE 20 MG/1
20 TABLET, FILM COATED ORAL
OUTPATIENT
Start: 2024-12-30

## 2024-12-30 RX ORDER — AMOXICILLIN 250 MG
1 CAPSULE ORAL 2 TIMES DAILY
OUTPATIENT
Start: 2024-12-30

## 2024-12-30 RX ORDER — SODIUM CHLORIDE 0.9 % (FLUSH) 0.9 %
3 SYRINGE (ML) INJECTION
OUTPATIENT
Start: 2024-12-30

## 2024-12-30 RX ORDER — HYDROMORPHONE HYDROCHLORIDE 0.2 MG/ML
0.2 INJECTION INTRAMUSCULAR; INTRAVENOUS; SUBCUTANEOUS EVERY 5 MIN PRN
OUTPATIENT
Start: 2024-12-30

## 2024-12-30 RX ORDER — HYDROCODONE BITARTRATE AND ACETAMINOPHEN 5; 325 MG/1; MG/1
1 TABLET ORAL EVERY 4 HOURS PRN
OUTPATIENT
Start: 2024-12-30

## 2024-12-30 NOTE — PROGRESS NOTES
Subjective:    Patient ID: Cliff Marroquin is a 44 y.o. y.o. female.     Chief Complaint:   Chief Complaint   Patient presents with    Pre-op Exam       History of Present Illness:  Cliff presents today for  preop  for TLH.  Currently not taking any medication.  She has had a longstanding history of abnormal heavy bleeding that currently an IUD nor previous endometrial ablation has been able to alleviate.  She desires definitive management.  She has completed her childbearing and has had a tubal ligation and ultimately a salpingectomy.  Procedure explained and all questions answered      Menstrual History:   Patient's last menstrual period was 2024..     OB History          4    Para   4    Term   4            AB        Living             SAB        IAB        Ectopic        Multiple        Live Births                   Current Outpatient Medications on File Prior to Visit   Medication Sig Dispense Refill    ALPRAZolam (XANAX) 0.25 MG tablet Take 1 tablet (0.25 mg total) by mouth 2 (two) times daily as needed for Anxiety. (Patient not taking: Reported on 10/30/2024) 10 tablet 0    DECARA 1,250 mcg (50,000 unit) capsule Take 50,000 Units by mouth every 7 days. (Patient not taking: Reported on 2024)      evening primrose oil 500 mg Cap Take 1 capsule by mouth 2 (two) times a day. (Patient not taking: Reported on 5/15/2024) 100 each 5    [DISCONTINUED] diazePAM (VALIUM) 5 MG tablet Take 1 tablet (5 mg total) by mouth every 12 (twelve) hours as needed for Anxiety. (Patient not taking: Reported on 10/30/2024) 21 tablet 0    [DISCONTINUED] HYDROcodone-acetaminophen (NORCO) 5-325 mg per tablet Take 1 tablet by mouth every 6 (six) hours as needed for Pain. (Patient not taking: Reported on 2022) 5 tablet 0    [DISCONTINUED] ibuprofen (ADVIL,MOTRIN) 600 MG tablet Take 1 tablet (600 mg total) by mouth every 6 (six) hours as needed for Pain. (Patient not taking: Reported on 2024) 20  tablet 0    [DISCONTINUED] norelgestromin-ethinyl estradiol 150-35 mcg/24 hr Apply 1 patch weekly x 3 weeks the one week no patch (Patient not taking: Reported on 12/30/2024) 9 patch 4     Current Facility-Administered Medications on File Prior to Visit   Medication Dose Route Frequency Provider Last Rate Last Admin    levonorgestreL (Mirena) 52 mg IUD 1 Intra Uterine Device  1 Intra Uterine Device Intrauterine     1 Intra Uterine Device at 10/30/24 0830        Review of Systems   Constitutional:  Negative for chills and fever.   Respiratory:  Negative for shortness of breath.    Cardiovascular:  Negative for chest pain.   Gastrointestinal:  Negative for constipation.   Genitourinary:  Positive for menorrhagia and menstrual problem. Negative for dysuria.   Neurological:  Negative for headaches.         Objective:    Vital Signs:  Vitals:    12/30/24 1301   BP: 137/65   Pulse: 96     Wt Readings from Last 1 Encounters:   12/30/24 83.9 kg (185 lb)     Body mass index is 28.98 kg/m².    Physical Exam:  General:  alert, no distress   Skin:  Skin color, texture, turgor normal. No rashes or lesions   HEENT:  conjunctivae/corneas clear. PERRL.   Neck: supple, trachea midline, no adenopathy or thyromegaly   Respiratory:  clear to auscultation bilaterally   Heart:  regular rate and rhythm, S1, S2 normal, no murmur, click, rub or gallop   Abdomen:  Soft, non-tender. Bowel sounds normal. No masses,  no organomegaly   Pelvis: External genitalia: normal general appearance  Urinary system: urethral meatus normal, bladder nontender  Vaginal: normal mucosa without prolapse or lesions  Cervix: normal appearance  Uterus: enlarged 10 weeks size  Adnexa: normal bimanual exam   Extremities: Normal ROM; no edema, no cyanosis   Neurological: Normal strength and tone. No focal numbness or weakness. Reflexes 2+ and equal.   Psychiatric: normal mood, speech, dress, and thought processes     Procedure explained including all risks, benefits,  alternatives and she desires to proceed.  She agrees to blood transfusion if absolutely necessary.  All questions answered and consent obtained.  Hysterectomy acknowledgement form also signed.    I spent a total of 20 minutes on the day of the visit.  This includes face to face time and non-face to face time preparing to see the patient (eg, review of tests), obtaining and/or reviewing separately obtained history, documenting clinical information in the electronic or other health record, independently interpreting results and communicating results to the patient/family/caregiver, or care coordinator.         Assessment:      1. Menorrhagia with irregular cycle    2. History of endometrial ablation    3. IUD (intrauterine device) in place    4. Preoperative exam for gynecologic surgery          Plan:      Menorrhagia with irregular cycle    History of endometrial ablation    IUD (intrauterine device) in place    Preoperative exam for gynecologic surgery      [facility] January 9       Tonia Tabor MD, FACOG   12/30/2024 1:08 PM

## 2024-12-30 NOTE — DISCHARGE INSTRUCTIONS
BEFORE THE PROCEDURE:    REPORT ANY CHANGE IN YOUR PHYSICAL CONDITION TO YOUR DOCTOR IMMEDIATELY.  SELF ISOLATE AND CHECK TEMPERATURE DAILY, IF TEMP OVER 100, CALL PHYSICIAN IMMEDIATELY.  TRY TO REFRAIN FROM SMOKING AND ALCOHOL 72 HOURS BEFORE YOUR PROCEDURE.   DO NOT EAT AFTER MIDNIGHT THE NIGHT BEFORE YOUR PROCEDURE. DO NOT DRINK ANYTHING AFTER MIDNIGHT EXCEPT YOU CAN HAVE WATER ONLY UP TO 4 HOURS PRIOR TO ARRIVAL.  NO MAKE UP, NAIL POLISH OR JEWELRY.      SURGERY PREP INSTRUCTIONS    SOMEONE WILL CALL YOU THE DAY BEFORE YOUR PROCEDURE WITH A CHECK-IN TIME FOR YOUR PROCEDURE.    DAY OF YOUR PROCEDURE:    TAKE BLOOD PRESSURE MEDICATIONS THE MORNING OF YOUR PROCEDURE, WITH SMALL SIPS WATER, AS DIRECTED BY YOUR PHYSICIAN.   DO NOT TAKE ANY DIABETIC MEDICATIONS UNLESS DIRECTED TO DO SO BY YOUR PHYSICIAN.   CONTACT LENSES AND DENTURES MUST BE REMOVED.  A RESPONSIBLE ADULT MUST ACCOMPANY YOU HOME UPON DISCHARGE.   ONLY 1 VISITOR ALLOWED PER ROOM.     YOUR THOUGHTS AND OPINIONS HELP US TO BETTER SERVE YOU.     PLEASE PARTICIPATE IN SURVEYS ABOUT YOUR CARE.    THANK YOU FOR CHOOSING [facility].            PREPARING SKIN BEFORE SURGERY      Preparing skin before surgery can reduce the risk of infection at surgical site. To make the process easier this facility has chosen disposable cloths moistened with rinse free 2% Chlorhexidine Gluconate antiseptic solution. The steps below outline the prepping process and should be carefully followed.        DIRECTIONS:     Shower or bathe the night prior to surgery, wait at least 2 hours before prepping skin with disposable cloths. Once  prepping begins do not apply lotions, moisturizers or make up.         * Open prep wipes by holding top of package in one hand and and lift flap on backside with the other hand.      * Prep from the jaw line down using all cloths in package.       * Avoid contact with eyes, ears and mouth.     Prep is used on:    1. Neck, shoulders, arms,  "underarms and chest.    2. Both hands, between fingers.    3. Abdomen, groin and perineum.     4. Right leg, foot and between toes.    5. Left leg, foot and between toes.    6. Back, back of neck and buttocks.     Allow areas to air dry 1 minute. Do not wipe off or rinse. It is normal for the skin to have a temporary "tacky" feel for several minutes after the antiseptic solutions is applied.     Shower or bathe the morning of surgery.  "

## 2024-12-30 NOTE — H&P (VIEW-ONLY)
Subjective:    Patient ID: Cliff Marroquin is a 44 y.o. y.o. female.     Chief Complaint:   Chief Complaint   Patient presents with    Pre-op Exam       History of Present Illness:  Cliff presents today for  preop  for TLH.  Currently not taking any medication.  She has had a longstanding history of abnormal heavy bleeding that currently an IUD nor previous endometrial ablation has been able to alleviate.  She desires definitive management.  She has completed her childbearing and has had a tubal ligation and ultimately a salpingectomy.  Procedure explained and all questions answered      Menstrual History:   Patient's last menstrual period was 2024..     OB History          4    Para   4    Term   4            AB        Living             SAB        IAB        Ectopic        Multiple        Live Births                   Current Outpatient Medications on File Prior to Visit   Medication Sig Dispense Refill    ALPRAZolam (XANAX) 0.25 MG tablet Take 1 tablet (0.25 mg total) by mouth 2 (two) times daily as needed for Anxiety. (Patient not taking: Reported on 10/30/2024) 10 tablet 0    DECARA 1,250 mcg (50,000 unit) capsule Take 50,000 Units by mouth every 7 days. (Patient not taking: Reported on 2024)      evening primrose oil 500 mg Cap Take 1 capsule by mouth 2 (two) times a day. (Patient not taking: Reported on 5/15/2024) 100 each 5    [DISCONTINUED] diazePAM (VALIUM) 5 MG tablet Take 1 tablet (5 mg total) by mouth every 12 (twelve) hours as needed for Anxiety. (Patient not taking: Reported on 10/30/2024) 21 tablet 0    [DISCONTINUED] HYDROcodone-acetaminophen (NORCO) 5-325 mg per tablet Take 1 tablet by mouth every 6 (six) hours as needed for Pain. (Patient not taking: Reported on 2022) 5 tablet 0    [DISCONTINUED] ibuprofen (ADVIL,MOTRIN) 600 MG tablet Take 1 tablet (600 mg total) by mouth every 6 (six) hours as needed for Pain. (Patient not taking: Reported on 2024) 20  tablet 0    [DISCONTINUED] norelgestromin-ethinyl estradiol 150-35 mcg/24 hr Apply 1 patch weekly x 3 weeks the one week no patch (Patient not taking: Reported on 12/30/2024) 9 patch 4     Current Facility-Administered Medications on File Prior to Visit   Medication Dose Route Frequency Provider Last Rate Last Admin    levonorgestreL (Mirena) 52 mg IUD 1 Intra Uterine Device  1 Intra Uterine Device Intrauterine     1 Intra Uterine Device at 10/30/24 0830        Review of Systems   Constitutional:  Negative for chills and fever.   Respiratory:  Negative for shortness of breath.    Cardiovascular:  Negative for chest pain.   Gastrointestinal:  Negative for constipation.   Genitourinary:  Positive for menorrhagia and menstrual problem. Negative for dysuria.   Neurological:  Negative for headaches.         Objective:    Vital Signs:  Vitals:    12/30/24 1301   BP: 137/65   Pulse: 96     Wt Readings from Last 1 Encounters:   12/30/24 83.9 kg (185 lb)     Body mass index is 28.98 kg/m².    Physical Exam:  General:  alert, no distress   Skin:  Skin color, texture, turgor normal. No rashes or lesions   HEENT:  conjunctivae/corneas clear. PERRL.   Neck: supple, trachea midline, no adenopathy or thyromegaly   Respiratory:  clear to auscultation bilaterally   Heart:  regular rate and rhythm, S1, S2 normal, no murmur, click, rub or gallop   Abdomen:  Soft, non-tender. Bowel sounds normal. No masses,  no organomegaly   Pelvis: External genitalia: normal general appearance  Urinary system: urethral meatus normal, bladder nontender  Vaginal: normal mucosa without prolapse or lesions  Cervix: normal appearance  Uterus: enlarged 10 weeks size  Adnexa: normal bimanual exam   Extremities: Normal ROM; no edema, no cyanosis   Neurological: Normal strength and tone. No focal numbness or weakness. Reflexes 2+ and equal.   Psychiatric: normal mood, speech, dress, and thought processes     Procedure explained including all risks, benefits,  alternatives and she desires to proceed.  She agrees to blood transfusion if absolutely necessary.  All questions answered and consent obtained.  Hysterectomy acknowledgement form also signed.    I spent a total of 20 minutes on the day of the visit.  This includes face to face time and non-face to face time preparing to see the patient (eg, review of tests), obtaining and/or reviewing separately obtained history, documenting clinical information in the electronic or other health record, independently interpreting results and communicating results to the patient/family/caregiver, or care coordinator.         Assessment:      1. Menorrhagia with irregular cycle    2. History of endometrial ablation    3. IUD (intrauterine device) in place    4. Preoperative exam for gynecologic surgery          Plan:      Menorrhagia with irregular cycle    History of endometrial ablation    IUD (intrauterine device) in place    Preoperative exam for gynecologic surgery      [facility] January 9       Tonia Tabor MD, FACOG   12/30/2024 1:08 PM

## 2024-12-30 NOTE — PRE-PROCEDURE INSTRUCTIONS
Pt states that she has high blood pressure , but hasn't taken medication in a long time.  Denies chest pain or shortness of breath

## 2025-01-08 ENCOUNTER — ANESTHESIA EVENT (OUTPATIENT)
Dept: SURGERY | Facility: HOSPITAL | Age: 45
End: 2025-01-08

## 2025-01-08 NOTE — ANESTHESIA PREPROCEDURE EVALUATION
01/08/2025  Cliff Marroquin is a 44 y.o., female.      Pre-op Assessment    I have reviewed the Patient Summary Reports.    I have reviewed the NPO Status.   I have reviewed the Medications.     Review of Systems  Anesthesia Hx:  No problems with previous Anesthesia             Denies Family Hx of Anesthesia complications.    Denies Personal Hx of Anesthesia complications.                    Social:  Non-Smoker       Cardiovascular:     Hypertension, well controlled              ECG has been reviewed.                            Pulmonary:  Pulmonary Normal                       Renal/:  Renal/ Normal                 Hepatic/GI:  Hepatic/GI Normal                    Neurological:  Neurology Normal                                      Endocrine:  Endocrine Normal              Lab Results   Component Value Date    WBC 9.44 12/30/2024    HGB 12.4 12/30/2024    HCT 36.0 (L) 12/30/2024    MCV 88 12/30/2024     12/30/2024      CMP  Sodium   Date Value Ref Range Status   12/30/2024 141 136 - 145 mmol/L Final     Potassium   Date Value Ref Range Status   12/30/2024 3.5 3.5 - 5.1 mmol/L Final     Chloride   Date Value Ref Range Status   12/30/2024 107 95 - 110 mmol/L Final     CO2   Date Value Ref Range Status   12/30/2024 31 (H) 23 - 29 mmol/L Final     Glucose   Date Value Ref Range Status   12/30/2024 85 70 - 110 mg/dL Final     BUN   Date Value Ref Range Status   12/30/2024 10 6 - 20 mg/dL Final     Creatinine   Date Value Ref Range Status   12/30/2024 1.0 0.5 - 1.4 mg/dL Final     Calcium   Date Value Ref Range Status   12/30/2024 8.5 (L) 8.7 - 10.5 mg/dL Final     Total Protein   Date Value Ref Range Status   12/30/2024 6.9 6.0 - 8.4 g/dL Final     Albumin   Date Value Ref Range Status   12/30/2024 3.4 (L) 3.5 - 5.2 g/dL Final     Total Bilirubin   Date Value Ref Range Status   12/30/2024 0.1 0.1 -  1.0 mg/dL Final     Comment:     For infants and newborns, interpretation of results should be based  on gestational age, weight and in agreement with clinical  observations.    Premature Infant recommended reference ranges:  Up to 24 hours.............<8.0 mg/dL  Up to 48 hours............<12.0 mg/dL  3-5 days..................<15.0 mg/dL  6-29 days.................<15.0 mg/dL    For patients on Eltrombopag therapy, use of Dimension [place] TBIL is   not   recommended.       Alkaline Phosphatase   Date Value Ref Range Status   12/30/2024 63 55 - 135 U/L Final     AST   Date Value Ref Range Status   12/30/2024 17 10 - 40 U/L Final     ALT   Date Value Ref Range Status   12/30/2024 15 10 - 44 U/L Final     Anion Gap   Date Value Ref Range Status   12/30/2024 3 3 - 11 mmol/L Final     eGFR   Date Value Ref Range Status   12/30/2024 >60.0 >60 mL/min/1.73 m^2 Final        Physical Exam  General: Well nourished    Airway:  Mallampati: II / I  Mouth Opening: Normal  TM Distance: Normal  Tongue: Normal  Neck ROM: Normal ROM    Dental:  Intact    Chest/Lungs:  Clear to auscultation    Heart:  Rate: Normal  Rhythm: Regular Rhythm  Sounds: Normal        Anesthesia Plan  Type of Anesthesia, risks & benefits discussed:    Anesthesia Type: Gen ETT  Intra-op Monitoring Plan: Standard ASA Monitors  Post Op Pain Control Plan: multimodal analgesia  Induction:  IV  Airway Plan: Direct  Informed Consent: Informed consent signed with the Patient and all parties understand the risks and agree with anesthesia plan.  All questions answered.   ASA Score: 2  Day of Surgery Review of History & Physical: I have interviewed and examined the patient. I have reviewed the patient's H&P dated: There are no significant changes.     Ready For Surgery From Anesthesia Perspective.     .

## 2025-01-09 ENCOUNTER — HOSPITAL ENCOUNTER (OUTPATIENT)
Facility: HOSPITAL | Age: 45
Discharge: HOME | End: 2025-01-10
Attending: OBSTETRICS & GYNECOLOGY | Admitting: OBSTETRICS & GYNECOLOGY

## 2025-01-09 ENCOUNTER — ANESTHESIA (OUTPATIENT)
Dept: SURGERY | Facility: HOSPITAL | Age: 45
End: 2025-01-09

## 2025-01-09 DIAGNOSIS — N92.1 MENORRHAGIA WITH IRREGULAR CYCLE: ICD-10-CM

## 2025-01-09 DIAGNOSIS — Z98.890 HISTORY OF ENDOMETRIAL ABLATION: ICD-10-CM

## 2025-01-09 DIAGNOSIS — Z01.818 PREOPERATIVE EXAM FOR GYNECOLOGIC SURGERY: ICD-10-CM

## 2025-01-09 DIAGNOSIS — G89.18 POST-OP PAIN: Primary | ICD-10-CM

## 2025-01-09 DIAGNOSIS — Z97.5 IUD (INTRAUTERINE DEVICE) IN PLACE: ICD-10-CM

## 2025-01-09 LAB
ABO + RH BLD: NORMAL
B-HCG UR QL: NEGATIVE
BLD GP AB SCN CELLS X3 SERPL QL: NORMAL
SPECIMEN OUTDATE: NORMAL

## 2025-01-09 PROCEDURE — 36000710: Performed by: OBSTETRICS & GYNECOLOGY

## 2025-01-09 PROCEDURE — 99900035 HC TECH TIME PER 15 MIN (STAT)

## 2025-01-09 PROCEDURE — 37000009 HC ANESTHESIA EA ADD 15 MINS: Performed by: OBSTETRICS & GYNECOLOGY

## 2025-01-09 PROCEDURE — 36415 COLL VENOUS BLD VENIPUNCTURE: CPT | Performed by: OBSTETRICS & GYNECOLOGY

## 2025-01-09 PROCEDURE — 27201423 OPTIME MED/SURG SUP & DEVICES STERILE SUPPLY: Performed by: OBSTETRICS & GYNECOLOGY

## 2025-01-09 PROCEDURE — 27000221 HC OXYGEN, UP TO 24 HOURS

## 2025-01-09 PROCEDURE — 94761 N-INVAS EAR/PLS OXIMETRY MLT: CPT

## 2025-01-09 PROCEDURE — 63600175 PHARM REV CODE 636 W HCPCS: Performed by: NURSE ANESTHETIST, CERTIFIED REGISTERED

## 2025-01-09 PROCEDURE — 58571 TLH W/T/O 250 G OR LESS: CPT | Mod: ,,, | Performed by: OBSTETRICS & GYNECOLOGY

## 2025-01-09 PROCEDURE — 81025 URINE PREGNANCY TEST: CPT | Performed by: OBSTETRICS & GYNECOLOGY

## 2025-01-09 PROCEDURE — 25000003 PHARM REV CODE 250: Performed by: NURSE ANESTHETIST, CERTIFIED REGISTERED

## 2025-01-09 PROCEDURE — 37000008 HC ANESTHESIA 1ST 15 MINUTES: Performed by: OBSTETRICS & GYNECOLOGY

## 2025-01-09 PROCEDURE — 25000003 PHARM REV CODE 250: Performed by: OBSTETRICS & GYNECOLOGY

## 2025-01-09 PROCEDURE — 99900031 HC PATIENT EDUCATION (STAT)

## 2025-01-09 PROCEDURE — 63600175 PHARM REV CODE 636 W HCPCS: Performed by: ANESTHESIOLOGY

## 2025-01-09 PROCEDURE — 86900 BLOOD TYPING SEROLOGIC ABO: CPT | Performed by: OBSTETRICS & GYNECOLOGY

## 2025-01-09 PROCEDURE — 36000711: Performed by: OBSTETRICS & GYNECOLOGY

## 2025-01-09 PROCEDURE — 63600175 PHARM REV CODE 636 W HCPCS: Performed by: OBSTETRICS & GYNECOLOGY

## 2025-01-09 PROCEDURE — 94799 UNLISTED PULMONARY SVC/PX: CPT

## 2025-01-09 PROCEDURE — C2628 CATHETER, OCCLUSION: HCPCS | Performed by: OBSTETRICS & GYNECOLOGY

## 2025-01-09 PROCEDURE — 71000033 HC RECOVERY, INTIAL HOUR: Performed by: OBSTETRICS & GYNECOLOGY

## 2025-01-09 RX ORDER — HYDROCODONE BITARTRATE AND ACETAMINOPHEN 5; 325 MG/1; MG/1
1 TABLET ORAL EVERY 4 HOURS PRN
Status: DISCONTINUED | OUTPATIENT
Start: 2025-01-09 | End: 2025-01-09

## 2025-01-09 RX ORDER — DIPHENHYDRAMINE HYDROCHLORIDE 50 MG/ML
25 INJECTION INTRAMUSCULAR; INTRAVENOUS EVERY 6 HOURS PRN
Status: DISCONTINUED | OUTPATIENT
Start: 2025-01-09 | End: 2025-01-09 | Stop reason: HOSPADM

## 2025-01-09 RX ORDER — ROCURONIUM BROMIDE 10 MG/ML
INJECTION, SOLUTION INTRAVENOUS
Status: DISCONTINUED | OUTPATIENT
Start: 2025-01-09 | End: 2025-01-09

## 2025-01-09 RX ORDER — GLYCOPYRROLATE 0.2 MG/ML
INJECTION, SOLUTION INTRAMUSCULAR; INTRAVENOUS
Status: DISCONTINUED | OUTPATIENT
Start: 2025-01-09 | End: 2025-01-09

## 2025-01-09 RX ORDER — LIDOCAINE HYDROCHLORIDE 10 MG/ML
0.5 INJECTION, SOLUTION EPIDURAL; INFILTRATION; INTRACAUDAL; PERINEURAL
Status: DISCONTINUED | OUTPATIENT
Start: 2025-01-09 | End: 2025-01-09 | Stop reason: HOSPADM

## 2025-01-09 RX ORDER — GLUCAGON 1 MG
1 KIT INJECTION
Status: DISCONTINUED | OUTPATIENT
Start: 2025-01-09 | End: 2025-01-09 | Stop reason: HOSPADM

## 2025-01-09 RX ORDER — HYDROCODONE BITARTRATE AND ACETAMINOPHEN 5; 325 MG/1; MG/1
1 TABLET ORAL EVERY 4 HOURS PRN
Status: DISCONTINUED | OUTPATIENT
Start: 2025-01-09 | End: 2025-01-10 | Stop reason: HOSPADM

## 2025-01-09 RX ORDER — ONDANSETRON HYDROCHLORIDE 2 MG/ML
INJECTION, SOLUTION INTRAMUSCULAR; INTRAVENOUS
Status: DISCONTINUED | OUTPATIENT
Start: 2025-01-09 | End: 2025-01-09

## 2025-01-09 RX ORDER — IBUPROFEN 400 MG/1
800 TABLET ORAL
Status: DISCONTINUED | OUTPATIENT
Start: 2025-01-10 | End: 2025-01-10 | Stop reason: HOSPADM

## 2025-01-09 RX ORDER — SODIUM CHLORIDE, SODIUM LACTATE, POTASSIUM CHLORIDE, CALCIUM CHLORIDE 600; 310; 30; 20 MG/100ML; MG/100ML; MG/100ML; MG/100ML
INJECTION, SOLUTION INTRAVENOUS CONTINUOUS
Status: DISCONTINUED | OUTPATIENT
Start: 2025-01-09 | End: 2025-01-09

## 2025-01-09 RX ORDER — HYDROCODONE BITARTRATE AND ACETAMINOPHEN 10; 325 MG/1; MG/1
1 TABLET ORAL EVERY 4 HOURS PRN
Status: DISCONTINUED | OUTPATIENT
Start: 2025-01-09 | End: 2025-01-10 | Stop reason: HOSPADM

## 2025-01-09 RX ORDER — AMOXICILLIN 250 MG
1 CAPSULE ORAL 2 TIMES DAILY
Status: DISCONTINUED | OUTPATIENT
Start: 2025-01-09 | End: 2025-01-10 | Stop reason: HOSPADM

## 2025-01-09 RX ORDER — HYDROMORPHONE HYDROCHLORIDE 1 MG/ML
0.5 INJECTION, SOLUTION INTRAMUSCULAR; INTRAVENOUS; SUBCUTANEOUS EVERY 5 MIN PRN
Status: DISCONTINUED | OUTPATIENT
Start: 2025-01-09 | End: 2025-01-09 | Stop reason: HOSPADM

## 2025-01-09 RX ORDER — ACETAMINOPHEN 325 MG/1
650 TABLET ORAL EVERY 4 HOURS PRN
Status: DISCONTINUED | OUTPATIENT
Start: 2025-01-09 | End: 2025-01-10 | Stop reason: HOSPADM

## 2025-01-09 RX ORDER — DIPHENHYDRAMINE HCL 25 MG
25 CAPSULE ORAL EVERY 4 HOURS PRN
Status: DISCONTINUED | OUTPATIENT
Start: 2025-01-09 | End: 2025-01-10 | Stop reason: HOSPADM

## 2025-01-09 RX ORDER — SODIUM CHLORIDE 0.9 % (FLUSH) 0.9 %
3 SYRINGE (ML) INJECTION
Status: DISCONTINUED | OUTPATIENT
Start: 2025-01-09 | End: 2025-01-09 | Stop reason: HOSPADM

## 2025-01-09 RX ORDER — CEFAZOLIN 2 G/1
2 INJECTION, POWDER, FOR SOLUTION INTRAMUSCULAR; INTRAVENOUS
Status: COMPLETED | OUTPATIENT
Start: 2025-01-09 | End: 2025-01-09

## 2025-01-09 RX ORDER — KETOROLAC TROMETHAMINE 30 MG/ML
30 INJECTION, SOLUTION INTRAMUSCULAR; INTRAVENOUS
Status: COMPLETED | OUTPATIENT
Start: 2025-01-09 | End: 2025-01-10

## 2025-01-09 RX ORDER — CEFAZOLIN SODIUM 2 G/50ML
2 SOLUTION INTRAVENOUS ONCE
Status: DISCONTINUED | OUTPATIENT
Start: 2025-01-09 | End: 2025-01-09

## 2025-01-09 RX ORDER — DIPHENHYDRAMINE HYDROCHLORIDE 50 MG/ML
25 INJECTION INTRAMUSCULAR; INTRAVENOUS EVERY 4 HOURS PRN
Status: DISCONTINUED | OUTPATIENT
Start: 2025-01-09 | End: 2025-01-10 | Stop reason: HOSPADM

## 2025-01-09 RX ORDER — ONDANSETRON HYDROCHLORIDE 2 MG/ML
4 INJECTION, SOLUTION INTRAVENOUS DAILY PRN
Status: DISCONTINUED | OUTPATIENT
Start: 2025-01-09 | End: 2025-01-09 | Stop reason: HOSPADM

## 2025-01-09 RX ORDER — MEPERIDINE HYDROCHLORIDE 25 MG/ML
12.5 INJECTION INTRAMUSCULAR; INTRAVENOUS; SUBCUTANEOUS ONCE AS NEEDED
Status: DISCONTINUED | OUTPATIENT
Start: 2025-01-09 | End: 2025-01-09 | Stop reason: HOSPADM

## 2025-01-09 RX ORDER — NEOSTIGMINE METHYLSULFATE 5 MG/5 ML
SYRINGE (ML) INTRAVENOUS
Status: DISCONTINUED | OUTPATIENT
Start: 2025-01-09 | End: 2025-01-09

## 2025-01-09 RX ORDER — ONDANSETRON 4 MG/1
8 TABLET, ORALLY DISINTEGRATING ORAL EVERY 8 HOURS PRN
Status: DISCONTINUED | OUTPATIENT
Start: 2025-01-09 | End: 2025-01-10 | Stop reason: HOSPADM

## 2025-01-09 RX ORDER — FENTANYL CITRATE 50 UG/ML
INJECTION, SOLUTION INTRAMUSCULAR; INTRAVENOUS
Status: DISCONTINUED | OUTPATIENT
Start: 2025-01-09 | End: 2025-01-09

## 2025-01-09 RX ORDER — POLYETHYLENE GLYCOL 3350 17 G/17G
17 POWDER, FOR SOLUTION ORAL DAILY
Status: DISCONTINUED | OUTPATIENT
Start: 2025-01-09 | End: 2025-01-10 | Stop reason: HOSPADM

## 2025-01-09 RX ORDER — MORPHINE SULFATE 4 MG/ML
4 INJECTION, SOLUTION INTRAMUSCULAR; INTRAVENOUS EVERY 5 MIN PRN
Status: DISCONTINUED | OUTPATIENT
Start: 2025-01-09 | End: 2025-01-09 | Stop reason: HOSPADM

## 2025-01-09 RX ORDER — SODIUM CHLORIDE 9 MG/ML
INJECTION, SOLUTION INTRAVENOUS CONTINUOUS
Status: DISCONTINUED | OUTPATIENT
Start: 2025-01-09 | End: 2025-01-09

## 2025-01-09 RX ORDER — SODIUM CHLORIDE, SODIUM LACTATE, POTASSIUM CHLORIDE, CALCIUM CHLORIDE 600; 310; 30; 20 MG/100ML; MG/100ML; MG/100ML; MG/100ML
INJECTION, SOLUTION INTRAVENOUS CONTINUOUS
Status: DISCONTINUED | OUTPATIENT
Start: 2025-01-09 | End: 2025-01-10 | Stop reason: HOSPADM

## 2025-01-09 RX ORDER — HYDROMORPHONE HYDROCHLORIDE 1 MG/ML
0.2 INJECTION, SOLUTION INTRAMUSCULAR; INTRAVENOUS; SUBCUTANEOUS EVERY 5 MIN PRN
Status: DISCONTINUED | OUTPATIENT
Start: 2025-01-09 | End: 2025-01-09

## 2025-01-09 RX ORDER — HYDROMORPHONE HYDROCHLORIDE 2 MG/ML
INJECTION, SOLUTION INTRAMUSCULAR; INTRAVENOUS; SUBCUTANEOUS
Status: DISCONTINUED | OUTPATIENT
Start: 2025-01-09 | End: 2025-01-09

## 2025-01-09 RX ORDER — FAMOTIDINE 20 MG/1
20 TABLET, FILM COATED ORAL
Status: DISCONTINUED | OUTPATIENT
Start: 2025-01-09 | End: 2025-01-09 | Stop reason: HOSPADM

## 2025-01-09 RX ORDER — MUPIROCIN 20 MG/G
OINTMENT TOPICAL
Status: COMPLETED | OUTPATIENT
Start: 2025-01-09 | End: 2025-01-09

## 2025-01-09 RX ORDER — ONDANSETRON HYDROCHLORIDE 2 MG/ML
4 INJECTION, SOLUTION INTRAVENOUS DAILY PRN
Status: DISCONTINUED | OUTPATIENT
Start: 2025-01-09 | End: 2025-01-09

## 2025-01-09 RX ORDER — MIDAZOLAM HYDROCHLORIDE 1 MG/ML
INJECTION INTRAMUSCULAR; INTRAVENOUS
Status: DISCONTINUED | OUTPATIENT
Start: 2025-01-09 | End: 2025-01-09

## 2025-01-09 RX ORDER — PROCHLORPERAZINE EDISYLATE 5 MG/ML
5 INJECTION INTRAMUSCULAR; INTRAVENOUS EVERY 10 MIN PRN
Status: DISCONTINUED | OUTPATIENT
Start: 2025-01-09 | End: 2025-01-09 | Stop reason: HOSPADM

## 2025-01-09 RX ORDER — PROCHLORPERAZINE EDISYLATE 5 MG/ML
5 INJECTION INTRAMUSCULAR; INTRAVENOUS EVERY 6 HOURS PRN
Status: DISCONTINUED | OUTPATIENT
Start: 2025-01-09 | End: 2025-01-10 | Stop reason: HOSPADM

## 2025-01-09 RX ORDER — CEFAZOLIN 2 G/1
2 INJECTION, POWDER, FOR SOLUTION INTRAMUSCULAR; INTRAVENOUS
Status: DISCONTINUED | OUTPATIENT
Start: 2025-01-09 | End: 2025-01-09

## 2025-01-09 RX ORDER — ACETAMINOPHEN 10 MG/ML
INJECTION, SOLUTION INTRAVENOUS
Status: DISCONTINUED | OUTPATIENT
Start: 2025-01-09 | End: 2025-01-09

## 2025-01-09 RX ORDER — HYDROMORPHONE HYDROCHLORIDE 1 MG/ML
1 INJECTION, SOLUTION INTRAMUSCULAR; INTRAVENOUS; SUBCUTANEOUS EVERY 4 HOURS PRN
Status: DISCONTINUED | OUTPATIENT
Start: 2025-01-09 | End: 2025-01-10 | Stop reason: HOSPADM

## 2025-01-09 RX ADMIN — KETOROLAC TROMETHAMINE 30 MG: 30 INJECTION, SOLUTION INTRAMUSCULAR; INTRAVENOUS at 06:01

## 2025-01-09 RX ADMIN — GLYCOPYRROLATE 0.8 MG: 0.2 INJECTION, SOLUTION INTRAMUSCULAR; INTRAVENOUS at 12:01

## 2025-01-09 RX ADMIN — MUPIROCIN: 20 OINTMENT TOPICAL at 06:01

## 2025-01-09 RX ADMIN — ONDANSETRON 4 MG: 2 INJECTION INTRAMUSCULAR; INTRAVENOUS at 10:01

## 2025-01-09 RX ADMIN — KETOROLAC TROMETHAMINE 30 MG: 30 INJECTION, SOLUTION INTRAMUSCULAR; INTRAVENOUS at 01:01

## 2025-01-09 RX ADMIN — FENTANYL CITRATE 100 MCG: 50 INJECTION INTRAMUSCULAR; INTRAVENOUS at 10:01

## 2025-01-09 RX ADMIN — ACETAMINOPHEN 1000 MG: 10 INJECTION, SOLUTION INTRAVENOUS at 11:01

## 2025-01-09 RX ADMIN — ROCURONIUM BROMIDE 40 MG: 10 INJECTION, SOLUTION INTRAVENOUS at 10:01

## 2025-01-09 RX ADMIN — FENTANYL CITRATE 50 MCG: 50 INJECTION INTRAMUSCULAR; INTRAVENOUS at 11:01

## 2025-01-09 RX ADMIN — MORPHINE SULFATE 4 MG: 4 INJECTION, SOLUTION INTRAMUSCULAR; INTRAVENOUS at 12:01

## 2025-01-09 RX ADMIN — MIDAZOLAM 2 MG: 1 INJECTION INTRAMUSCULAR; INTRAVENOUS at 10:01

## 2025-01-09 RX ADMIN — SODIUM CHLORIDE, POTASSIUM CHLORIDE, SODIUM LACTATE AND CALCIUM CHLORIDE: 600; 310; 30; 20 INJECTION, SOLUTION INTRAVENOUS at 11:01

## 2025-01-09 RX ADMIN — POLYETHYLENE GLYCOL (3350) 17 G: 17 POWDER, FOR SOLUTION ORAL at 01:01

## 2025-01-09 RX ADMIN — PROCHLORPERAZINE EDISYLATE 5 MG: 5 INJECTION INTRAMUSCULAR; INTRAVENOUS at 05:01

## 2025-01-09 RX ADMIN — HYDROMORPHONE HYDROCHLORIDE 0.5 MG: 2 INJECTION, SOLUTION INTRAMUSCULAR; INTRAVENOUS; SUBCUTANEOUS at 12:01

## 2025-01-09 RX ADMIN — SODIUM CHLORIDE, POTASSIUM CHLORIDE, SODIUM LACTATE AND CALCIUM CHLORIDE: 600; 310; 30; 20 INJECTION, SOLUTION INTRAVENOUS at 06:01

## 2025-01-09 RX ADMIN — SODIUM CHLORIDE, POTASSIUM CHLORIDE, SODIUM LACTATE AND CALCIUM CHLORIDE: 600; 310; 30; 20 INJECTION, SOLUTION INTRAVENOUS at 01:01

## 2025-01-09 RX ADMIN — Medication 5 MG: at 12:01

## 2025-01-09 RX ADMIN — SENNOSIDES AND DOCUSATE SODIUM 1 TABLET: 50; 8.6 TABLET ORAL at 09:01

## 2025-01-09 RX ADMIN — CEFAZOLIN 2 G: 2 INJECTION, POWDER, FOR SOLUTION INTRAMUSCULAR; INTRAVENOUS at 10:01

## 2025-01-09 NOTE — INTERVAL H&P NOTE
The patient has been examined and the H&P has been reviewed:    I concur with the findings and no changes have occurred since H&P was written.    Surgery risks, benefits and alternative options discussed and understood by patient/family.          Active Hospital Problems    Diagnosis  POA    *Menorrhagia with irregular cycle [N92.1]  Yes    History of endometrial ablation [Z98.890]  Not Applicable    IUD (intrauterine device) in place [Z97.5]  Not Applicable      Resolved Hospital Problems   No resolved problems to display.

## 2025-01-09 NOTE — TRANSFER OF CARE
Anesthesia Transfer of Care Note    Patient: Cliff Marroquin    Procedure(s) Performed: Procedure(s) (LRB):  HYSTERECTOMY,TOTAL,LAPAROSCOPIC,WITH SALPINGECTOMY (Bilateral)    Patient location: PACU    Anesthesia Type: general    Transport from OR: Transported from OR on room air with adequate spontaneous ventilation    Post pain: adequate analgesia    Post assessment: no apparent anesthetic complications    Post vital signs: stable    Level of consciousness: awake    Nausea/Vomiting: no nausea/vomiting    Complications: none    Transfer of care protocol was followed      Last vitals:   98/55  16 RR  37 C TEMP  67 HR  100% O2 SAT

## 2025-01-09 NOTE — PLAN OF CARE
Patient prepared for procedure. Mother at the bedside. No complaints or concerns voiced at present time. Denies pain. Nasal ointment completed without difficulty. 20g IV inserted to right hand, infusing. No s/s of infiltration present. Bed locked and in lowest position to the floor. Call bell placed in reach for any needs.

## 2025-01-09 NOTE — OP NOTE
Surgery Date: 1/9/2025     Surgeons and Role:     * Tonia Tabor MD - Primary    Pre-op Diagnosis:    Menorrhagia with irregular cycle [N92.1]  Postcoital bleeding [N93.0]  History of endometrial ablation [Z98.890]  IUD (intrauterine device) in place [Z97.5]  History of bilateral salpingectomy    Post-op Diagnosis:    Menorrhagia with irregular cycle [N92.1]  Postcoital bleeding [N93.0]  History of endometrial ablation [Z98.890]  IUD (intrauterine device) in place [Z97.5]  History of bilateral salpingectomy  Adenomyosis     Procedure(s):  Procedure(s):  HYSTERECTOMY, TOTAL, LAPAROSCOPIC    Specimens (From admission, onward)       Start     Ordered    01/09/25 1138  Specimen to Pathology, Surgery Gynecology and Obstetrics  Once        Comments: Pre-op Diagnosis: Menorrhagia with irregular cycle [N92.1]Postcoital bleeding [N93.0]History of endometrial ablation [Z98.890]IUD (intrauterine device) in place [Z97.5]Procedure(s):HYSTERECTOMY,TOTAL,LAPAROSCOPIC,WITH SALPINGECTOMY Number of specimens: 1Name of specimens: uterus and cervix @ 1200     References:    Click here for ordering Quick Tip   Question Answer Comment   Procedure Type: Gynecology and Obstetrics    Release to patient Immediate        01/09/25 1201                    Anesthesia: General    EBL: 100 mL  UOP: 100 mL  IV fluids: 1500 mL    Findings:  12 week size uterus with multiple small fibroids no larger than 2 cm each.  Adenomyosis noted throughout.  Ovaries noted to be normal bilaterally; evidence of bilateral salpingectomy    Procedure in Detail:  The patient was placed on the operating table in the dorsal supine position and given satisfactory general endotracheal anesthesia.  She was then placed in the lithotomy position. The abdomen was prepped with Chloroprep and the vagina was prepped with betadine solution. A Dorsey catheter was placed into the bladder for drainage.  She was then draped in the usual manner for laparoscopic procedure.     After  assuring adequate anesthesia, a sterile weighted speculum was placed into the vagina and the anterior lip of the cervix grasped with a single-tooth tenaculum.  The IUD strings were visible and grasped with a ring forceps; the IUD was removed and discarded.  The uterus was gently sounded to 12 cm and the cervix dilated to allow a LIMA uterine manipulator to be placed.  Stay sutures of 0 Vicryl were placed at the 12 and 6 o'clock positions of the cervix.  The LIMA uterine manipulator and KOH colpotomizer were assembled and inserted without difficulty.    Attention was then turned to the abdomen where a 1 cm infraumbilical skin incision was then made. A Veress needle was introduced into the abdomen without difficulty. The abdomen was inflated with CO2 to a pressure of 15mm Hg.  The Veress needle was removed and a 10 mm Optiview port was then placed in the umbilicus without difficulty using the laparoscope as guidance for entering the abdomen. Following this 5mm ports were also placed in the right and left lower quadrants under direct visualization.    The pelvis was inspected. The uterus appeared enlarged, c/w 12 week size.  The ovaries appeared normal bilaterally. The pelvic peritoneum appeared Normal. The upper abdomen was inspected and appeared Normal.    At this time, the left round ligament was then grasped with the LigaSure and transected without difficulty.  The left side of the bladder flap was then created using the LigaSure to incise the anterior peritoneum of the broad ligament in Alexander-Blankenship fashion.  The utero-ovarian ligament was then ligated and divided with the LigaSure without difficulty.  The uterine vessels were skeletonized on the left side, and then ligated and divided with the LigaSure.    Attention was turned to the opposite side where I performed the same procedure on the patient's right side dividing the round ligament,  utero-ovarian ligament and completing the bladder flap. The uterine vessels  were then skeletonized, and then ligated and divided with the LigaSure.    At this time with both uterine vessels secured and hemostatsis being adequate the procedure was continued.  The uterus was anteflexed with the LIMA and a posterior colpotomy incision was made with the harmonic scalpel.  The uterus was then retroflexed and an anterior colpotomy incision was made with the harmonic scalpel.  The incision was extended laterally to the level of the uterine arteries.  The uterine arteries were then ligated and divided with the LigaSure, and the colpotomy incisions were connected with the harmonic scalpel.  The uterus and cervix were removed vaginally and a laparotomy sponge wrapped in a glove was inserted into the vagina to maintain a pneumoperitoneum.  The operative field was irrigated and hemostasis was obtained with the LigaSure.  The vaginal cuff was then closed with a 0 Vicryl V lock suture in a running fashion.  Excellent hemostasis was noted.    The abdomen was deflated of CO2  and again inspected for bleeding. With no bleeding noted the abdomen was reinflated with CO2. At this point, the pelvis was irrigated with saline and an Surgicel was placed over the vaginal sutures. The abdomen was again deflated of CO2. The ports were removed and the skin incisions were closed with 4-0 Monocryl subcuticular stitch.      The patient tolerated the procedure well and left the operating room awake, alert, and with vital signs stable.      Estimated blood loss was approximately 100cc.

## 2025-01-09 NOTE — ANESTHESIA PROCEDURE NOTES
Intubation    Date/Time: 1/9/2025 10:49 AM    Performed by: Hernando Damian CRNA  Authorized by: Hernando Damian CRNA    Intubation:     Induction:  Intravenous    Intubated:  Postinduction    Mask Ventilation:  Not attempted    Attempts:  1    Attempted By:  CRNA    Method of Intubation:  Direct    Blade:  Rasheeda 4    Laryngeal View Grade: Grade I - full view of cords      Difficult Airway Encountered?: No      Complications:  None    Airway Device:  Oral endotracheal tube    Airway Device Size:  7.0    Style/Cuff Inflation:  Cuffed    Tube secured:  21    Secured at:  The lips    Placement Verified By:  Capnometry    Complicating Factors:  None    Findings Post-Intubation:  BS equal bilateral and atraumatic/condition of teeth unchanged

## 2025-01-09 NOTE — Clinical Note
Virtual nurse role explained to patient virtually via [employer]IDY[employer]. Demographics, preferred pharmacy and patient portal status reviewed. Required admission documentation reviewed-and completed as necessary. Home medications discussed -flagging medications for removal that patient states they no longer take. Diagnosis specific CPG added to Care Plan template-if applicable.   Active listening utilized addressing any concerns. All questions answered.

## 2025-01-10 VITALS
DIASTOLIC BLOOD PRESSURE: 59 MMHG | SYSTOLIC BLOOD PRESSURE: 110 MMHG | HEIGHT: 67 IN | RESPIRATION RATE: 18 BRPM | WEIGHT: 184.94 LBS | BODY MASS INDEX: 29.03 KG/M2 | OXYGEN SATURATION: 97 % | TEMPERATURE: 99 F | HEART RATE: 81 BPM

## 2025-01-10 PROBLEM — G89.18 POST-OP PAIN: Status: ACTIVE | Noted: 2025-01-10

## 2025-01-10 PROCEDURE — 94761 N-INVAS EAR/PLS OXIMETRY MLT: CPT

## 2025-01-10 PROCEDURE — 99900031 HC PATIENT EDUCATION (STAT)

## 2025-01-10 PROCEDURE — 25000003 PHARM REV CODE 250: Performed by: OBSTETRICS & GYNECOLOGY

## 2025-01-10 PROCEDURE — 99900035 HC TECH TIME PER 15 MIN (STAT)

## 2025-01-10 PROCEDURE — 63600175 PHARM REV CODE 636 W HCPCS: Performed by: OBSTETRICS & GYNECOLOGY

## 2025-01-10 RX ORDER — HYDROCODONE BITARTRATE AND ACETAMINOPHEN 5; 325 MG/1; MG/1
1 TABLET ORAL EVERY 6 HOURS PRN
Qty: 20 TABLET | Refills: 0 | Status: SHIPPED | OUTPATIENT
Start: 2025-01-10

## 2025-01-10 RX ORDER — DOCUSATE SODIUM 100 MG/1
100 CAPSULE, LIQUID FILLED ORAL 2 TIMES DAILY
Qty: 60 CAPSULE | Refills: 1 | Status: SHIPPED | OUTPATIENT
Start: 2025-01-10

## 2025-01-10 RX ORDER — IBUPROFEN 600 MG/1
600 TABLET ORAL 3 TIMES DAILY
Qty: 30 TABLET | Refills: 1 | Status: SHIPPED | OUTPATIENT
Start: 2025-01-10

## 2025-01-10 RX ADMIN — SODIUM CHLORIDE, POTASSIUM CHLORIDE, SODIUM LACTATE AND CALCIUM CHLORIDE: 600; 310; 30; 20 INJECTION, SOLUTION INTRAVENOUS at 03:01

## 2025-01-10 RX ADMIN — SENNOSIDES AND DOCUSATE SODIUM 1 TABLET: 50; 8.6 TABLET ORAL at 08:01

## 2025-01-10 RX ADMIN — POLYETHYLENE GLYCOL (3350) 17 G: 17 POWDER, FOR SOLUTION ORAL at 08:01

## 2025-01-10 RX ADMIN — KETOROLAC TROMETHAMINE 30 MG: 30 INJECTION, SOLUTION INTRAMUSCULAR; INTRAVENOUS at 01:01

## 2025-01-10 RX ADMIN — HYDROCODONE BITARTRATE AND ACETAMINOPHEN 1 TABLET: 10; 325 TABLET ORAL at 09:01

## 2025-01-10 RX ADMIN — KETOROLAC TROMETHAMINE 30 MG: 30 INJECTION, SOLUTION INTRAMUSCULAR; INTRAVENOUS at 08:01

## 2025-01-10 NOTE — DISCHARGE SUMMARY
[facility] Surg  Discharge Note  Short Stay    Procedure(s) (LRB):  HYSTERECTOMY, TOTAL, LAPAROSCOPIC (Bilateral)      OUTCOME: Patient tolerated treatment/procedure well without complication and is now ready for discharge.    DISPOSITION: Home or Self Care    FINAL DIAGNOSIS:  Menorrhagia with irregular cycle    FOLLOWUP: In clinic    DISCHARGE INSTRUCTIONS:  regular diet. No heavy lifting. Pelvic rest--no sex, tampons    TIME SPENT ON DISCHARGE: 20 minutes

## 2025-01-10 NOTE — ANESTHESIA POSTPROCEDURE EVALUATION
Anesthesia Post Evaluation    Patient: Cliff Marroquin    Procedure(s) Performed: Procedure(s) (LRB):  HYSTERECTOMY, TOTAL, LAPAROSCOPIC (Bilateral)    Final Anesthesia Type: general      Patient location during evaluation: med/surg floor  Patient participation: Yes- Able to Participate  Level of consciousness: awake  Post-procedure vital signs: reviewed and stable  Pain management: adequate  Airway patency: patent    PONV status at discharge: No PONV  Anesthetic complications: no      Cardiovascular status: blood pressure returned to baseline  Respiratory status: spontaneous ventilation  Hydration status: euvolemic  Follow-up not needed.              Vitals Value Taken Time   /59 01/10/25 0829   Temp 37.1 °C (98.8 °F) 01/10/25 0459   Pulse 81 01/10/25 0829   Resp 16 01/10/25 0741   SpO2 97 % 01/10/25 0829         Event Time   Out of Recovery 13:07:00         Pain/Sunitha Score: Pain Rating Prior to Med Admin: 8 (1/10/2025  8:34 AM)  Pain Rating Post Med Admin: 0 (1/10/2025  1:58 AM)  Sunitha Score: 9 (1/9/2025 12:51 PM)

## 2025-01-10 NOTE — PLAN OF CARE
Plan of care reviewed with patient. Peripheral IV in place and infusing LR @75 ml/hr. Scheduled Toradol providing adequate pain relief. Abd dressings CDI. Dorsey out and pt ambulating self to bathroom to void w/o complaint or complication. VSS. NADN. Pt free from falls and injury. Questions and concerns addressed. Pt belongings and call bell within reach.   Problem: Adult Inpatient Plan of Care  Goal: Plan of Care Review  Outcome: Progressing  Goal: Patient-Specific Goal (Individualized)  Outcome: Progressing  Goal: Absence of Hospital-Acquired Illness or Injury  Outcome: Progressing  Goal: Optimal Comfort and Wellbeing  Outcome: Progressing  Goal: Readiness for Transition of Care  Outcome: Progressing     Problem: Infection  Goal: Absence of Infection Signs and Symptoms  Outcome: Progressing     Problem: Wound  Goal: Optimal Coping  Outcome: Progressing  Goal: Optimal Functional Ability  Outcome: Progressing  Goal: Absence of Infection Signs and Symptoms  Outcome: Progressing  Goal: Improved Oral Intake  Outcome: Progressing  Goal: Optimal Pain Control and Function  Outcome: Progressing  Goal: Skin Health and Integrity  Outcome: Progressing  Goal: Optimal Wound Healing  Outcome: Progressing     Problem: Hysterectomy Total or Partial  Goal: Absence of Bleeding  Outcome: Progressing  Goal: Effective Bowel Elimination  Outcome: Progressing  Goal: Fluid and Electrolyte Balance  Outcome: Progressing  Goal: Absence of Infection Signs and Symptoms  Outcome: Progressing  Goal: Anesthesia/Sedation Recovery  Outcome: Progressing  Goal: Acceptable Pain Control  Outcome: Progressing  Goal: Nausea and Vomiting Relief  Outcome: Progressing  Goal: Effective Urinary Elimination  Outcome: Progressing  Goal: Effective Oxygenation and Ventilation  Outcome: Progressing     Problem: Fall Injury Risk  Goal: Absence of Fall and Fall-Related Injury  Outcome: Progressing

## 2025-01-10 NOTE — PLAN OF CARE
Discharge instructions reviewed with the patient via virtual discharge nurse, peripheral IV removed and intact. Patient wheeled downstairs by staff to personal transportation.

## 2025-01-10 NOTE — DISCHARGE INSTRUCTIONS
DISCHARGE INSTRUCTIONS:  regular diet. No heavy lifting. Pelvic rest--no sex, tampons       Our goal at [facility] is to always give you outstanding care and exceptional service. You may receive a survey by mail, text or e-mail in the next 7-10 days from June Pruitt and our leadership team asking about the care you received with us. The survey should only take 5-10 minutes to complete and is very important to us.     Your feedback provides us with a way to recognize our staff who work tirelessly to provide the best care! Also, your responses help us learn how to improve when your experience was below our aspiration of excellence. We WILL use your feedback to continue making improvements to help us provide the highest quality care. We keep your personal information and feedback confidential. We appreciate your time completing this survey and can't wait to hear from you!!!     We want you to leave us today feeling like you can DEFINITELY recommend us to others! We look forward to your continued care with us! Thanks so much for choosing [facility] for your healthcare needs!

## 2025-01-10 NOTE — PROGRESS NOTES
Progress Note    1 Day Post-Op   [place] LAPAROSCOPY TOT HYSTERECTOMY UTERUS >250 GRAM W TUBE/OVARY [25721] (HYSTERECTOMY,TOTAL,LAPAROSCOPIC,WITH SALPINGECTOMY)     Patient Active Problem List   Diagnosis    Menorrhagia with irregular cycle    History of endometrial ablation    IUD (intrauterine device) in place       Cliff reports that she is feeling well postoperatively. She is tolerating feedings. She denies nausea, vomiting. She is not passing flatus.  She reports that pain is well controlled with pain meds. Dorsey has been discontinued and she is voiding without difficulty.    Vitals:    01/10/25 0459   BP: (!) 93/50   Pulse: 78   Resp: 20   Temp: 98.8 °F (37.1 °C)         Temp Range:  Temp (24hrs), Av °F (36.7 °C), Min:97 °F (36.1 °C), Max:98.8 °F (37.1 °C)       Exam:    Lungs: clear to auscultation, no wheezes, rales or rhonchi, symmetric air entry    Cardiac: normal sinus rhythm    Abd: soft, bowel sounds active, non-tender    Incision: Healing well. No erythema, significant drainage. Sutures intact.    Ext: no cyanosis, clubbing, edema             Recent labs:     WBC   Date Value Ref Range Status   2024 9.44 3.90 - 12.70 K/uL Final     Hemoglobin   Date Value Ref Range Status   2024 12.4 12.0 - 16.0 g/dL Final     Hematocrit   Date Value Ref Range Status   2024 36.0 (L) 37.0 - 48.5 % Final     BMP wnl      I/O last 3 completed shifts:  In: 1791.7 [I.V.:1691.7; IV Piggyback:100]  Out: 1080 [Urine:980; Blood:100]  No intake/output data recorded.          Assessment: POD#1, s/p TLH     Plan: Activity: ad latrice   Diet: General/Regular   Medications: current medication regimen unchanged.   Discharge planning: will discharge home today    Tonia Tabor MD, FACOG

## 2025-01-10 NOTE — PLAN OF CARE
POC reviewed w/ pt and purposeful rounding ongoing. Meds administered per MAR. IVF infusing per oders. PRN compazine given for nausea x1 w/ relief obtained. Pain controled w/ scheduled ketorolac. VSS on 2L NC. Pt AAOx4 and turns self. Dorsey intact and CAUTI complete at this time. Call bell and personal items w/ in reach. Pt denies needs at this time.     Problem: Adult Inpatient Plan of Care  Goal: Plan of Care Review  Outcome: Progressing  Goal: Patient-Specific Goal (Individualized)  Outcome: Progressing  Goal: Absence of Hospital-Acquired Illness or Injury  Outcome: Progressing  Goal: Optimal Comfort and Wellbeing  Outcome: Progressing  Goal: Readiness for Transition of Care  Outcome: Progressing     Problem: Infection  Goal: Absence of Infection Signs and Symptoms  Outcome: Progressing     Problem: Wound  Goal: Optimal Coping  Outcome: Progressing  Goal: Optimal Functional Ability  Outcome: Progressing  Goal: Absence of Infection Signs and Symptoms  Outcome: Progressing  Goal: Improved Oral Intake  Outcome: Progressing  Goal: Optimal Pain Control and Function  Outcome: Progressing  Goal: Skin Health and Integrity  Outcome: Progressing  Goal: Optimal Wound Healing  Outcome: Progressing     Problem: Hysterectomy Total or Partial  Goal: Absence of Bleeding  Outcome: Progressing  Goal: Effective Bowel Elimination  Outcome: Progressing  Goal: Fluid and Electrolyte Balance  Outcome: Progressing  Goal: Absence of Infection Signs and Symptoms  Outcome: Progressing  Goal: Anesthesia/Sedation Recovery  Outcome: Progressing  Goal: Acceptable Pain Control  Outcome: Progressing  Goal: Nausea and Vomiting Relief  Outcome: Progressing  Goal: Effective Urinary Elimination  Outcome: Progressing  Goal: Effective Oxygenation and Ventilation  Outcome: Progressing     Problem: Fall Injury Risk  Goal: Absence of Fall and Fall-Related Injury  Outcome: Progressing

## 2025-01-15 LAB — SPECIMEN TO PATHOLOGY - SURGICAL: NORMAL

## 2025-01-24 ENCOUNTER — OFFICE VISIT (OUTPATIENT)
Dept: OBSTETRICS AND GYNECOLOGY | Facility: CLINIC | Age: 45
End: 2025-01-24

## 2025-01-24 VITALS
BODY MASS INDEX: 28.88 KG/M2 | SYSTOLIC BLOOD PRESSURE: 112 MMHG | WEIGHT: 184 LBS | DIASTOLIC BLOOD PRESSURE: 65 MMHG | HEIGHT: 67 IN | HEART RATE: 91 BPM

## 2025-01-24 DIAGNOSIS — Z90.710 S/P LAPAROSCOPIC HYSTERECTOMY: Primary | ICD-10-CM

## 2025-01-24 PROCEDURE — 99024 POSTOP FOLLOW-UP VISIT: CPT | Mod: ,,, | Performed by: OBSTETRICS & GYNECOLOGY

## 2025-01-24 PROCEDURE — 99999 PR PBB SHADOW E&M-EST. PATIENT-LVL III: CPT | Mod: PBBFAC,,, | Performed by: OBSTETRICS & GYNECOLOGY

## 2025-01-24 PROCEDURE — 99213 OFFICE O/P EST LOW 20 MIN: CPT | Mod: PBBFAC | Performed by: OBSTETRICS & GYNECOLOGY

## 2025-01-24 NOTE — PROGRESS NOTES
Subjective:    Patient ID: Cliff Marroquin is a 44 y.o. y.o. female    Chief Complaint:   Chief Complaint   Patient presents with    Post-op Evaluation     States feeling well       History of Present Illness:  Cliff presents today for follow up of hysterectomy 2 weeks ago.  She states that she is feeling well.  She has no vaginal bleeding; occasional discharge.  She reports no pain.  She wants to return to work as soon as possible.  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      Review of Systems   Constitutional:  Negative for chills and fever.   Respiratory:  Negative for shortness of breath.    Cardiovascular:  Negative for chest pain.   Gastrointestinal:  Negative for abdominal pain and constipation.   Genitourinary:  Negative for pelvic pain and vaginal bleeding.   Neurological:  Negative for headaches.         Objective:    Vital Signs:  Vitals:    01/24/25 0906   BP: 112/65   Pulse: 91     Wt Readings from Last 1 Encounters:   01/24/25 83.5 kg (184 lb)     Body mass index is 28.82 kg/m².    Physical Exam:  General:  alert, no distress   Skin:  Skin color, texture, turgor normal. No rashes or lesions   Abdomen:  Soft, nontender. Incision clean, dry, intact. No evidence of infection   Extremities: No cyanosis, clubbing, edema         Activities and limitations reiterated and all questions answered.  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 S/P laparoscopic hysterectomy          Plan:      S/P laparoscopic hysterectomy           Tonia Tabor MD, FACOG   01/24/2025 9:22 AM

## 2025-02-05 NOTE — PROGRESS NOTES
"Chief complaint:    Chief Complaint   Patient presents with    Mass     Pt has a bump on the upper platte  x month   Pt has no issues eating or drinking   The size has not changed   Pt has sinus issues pt has a ct done 1 month ago             Referring Provider:  Self, Aaareferral  No address on file      History of present illness:     Ms. Marroquin is a 44 y.o. presenting for evaluation of mass on palate.     Noted 6 weeks ago. Noted when brushing hard and had some bleeding. So she looked in her mouth and saw a knot on her palate.     Sinus symptoms include clear rhinorrhea,     facial pressure/pain, nasal obstruction, purulent rhinorrhea, or anosmia/hyposmia.   No prior sinus surgery    She complains of numbness and twitching across her body    History      Past Medical History:   Past Medical History:   Diagnosis Date    COVID-19 06/15/2020    Essential (primary) hypertension     Vaginal bleeding 03/2022    Wrist fracture     14 years old         Past Surgical History:  Past Surgical History:   Procedure Laterality Date    HYSTEROSCOPY WITH HYDROTHERMAL ABLATION OF ENDOMETRIUM WITH DILATION AND CURETTAGE N/A 03/21/2022    Procedure: HYSTEROSCOPY, WITH DILATION AND CURETTAGE OF UTERUS AND HYDROTHERMAL ENDOMETRIAL ABLATION;  Surgeon: Tonia Tabor MD;  Location: [facility];  Service: OB/GYN;  Laterality: N/A;  2  0630    LAPAROSCOPIC TOTAL HYSTERECTOMY Bilateral 1/9/2025    Procedure: HYSTERECTOMY, TOTAL, LAPAROSCOPIC;  Surgeon: Tonia Tabor MD;  Location: [facility];  Service: OB/GYN;  Laterality: Bilateral;  1st 0600    TUBAL LIGATION      twice, became pregnant after 1st tubal "clip came off"         Medications: Medication list reviewed. She  has a current medication list which includes the following prescription(s): docusate sodium, hydrocodone-acetaminophen, and ibuprofen.     Allergies: Review of patient's allergies indicates:  No Known Allergies      Family history: family history includes Hypertension in " "her mother; No Known Problems in her brother, brother, daughter, father, maternal aunt, maternal grandfather, maternal grandmother, maternal uncle, paternal aunt, paternal grandfather, paternal grandmother, paternal uncle, sister, and another family member.         Social History          Alcohol use:  reports current alcohol use.            Tobacco:  reports that she has never smoked. She has never used smokeless tobacco.         Physical Examination      Vitals: Height 5' 7" (1.702 m), weight 83.5 kg (184 lb 1.4 oz), last menstrual period 12/23/2024.      General: Well developed, well nourished, well hydrated.     Voice: no dysphonia, no dysarthria      Head/Face: Normocephalic, atraumatic. No scars or lesions. Facial musculature equal.     Eyes: No scleral icterus or conjunctival hemorrhage. EOMI. PERRLA.     Ears:     Right ear: No gross deformity. EAC is clear of debris and erythema. TM are intact with a pneumatized middle ear. No signs of retraction, fluid or infection.      Left ear: No gross deformity. EAC is clear of debris and erythema. TM are intact with a pneumatized middle ear. No signs of retraction, fluid or infection.      Nose: No gross deformity or lesions. No purulent discharge. No significant NSD.      Mouth/Oropharynx: Lips without any lesions. No mucosal lesions within the oropharynx. No tonsillar exudate or lesions. Pharyngeal walls symmetrical. Uvula midline. Tongue midline without lesions. Torus palatini, healthy mucosa overlying     Neck: Trachea midline. No masses. No thyromegaly or nodules palpated.     Lymphatic: No lymphadenopathy in the neck.     Extremities: No cyanosis. Warm and well-perfused.     Skin: No scars or lesions on face or neck.      Neurologic: Moving all extremities without gross abnormality.CN II-XII grossly intact. House-Brackmann 1/6. No signs of nystagmus.          Data reviewed      Review of records:      I reviewed records from the referring provider's office " visits, including the history, workup, and/or treatment of this problem thus far.      Imaging:      I have independently reviewed the following imaging with the findings noted below:     CT sinus 12/18/24    Opacification of one anterior ethmoid cell    Assessment/Plan:    1. Chronic ethmoidal sinusitis    2. Torus palatinus      Opacification of one anterior ethmoid cell but no symptoms of Chronic Rhinosinusitis    She does have vasomotor rhinitis and we will start atrovent  She will return if Chronic Rhinosinusitis symptoms develop    Reassured that the torus is a benign finding. We discussed removal for pain or large torus           Ion Rendon MD  [facility]   [facility]  [address] [facility].  [place] [address]  P: [phone]  F: [phone]

## 2025-02-06 ENCOUNTER — OFFICE VISIT (OUTPATIENT)
Dept: SURGERY | Facility: CLINIC | Age: 45
End: 2025-02-06
Payer: MEDICAID

## 2025-02-06 ENCOUNTER — OFFICE VISIT (OUTPATIENT)
Dept: OTOLARYNGOLOGY | Facility: CLINIC | Age: 45
End: 2025-02-06
Payer: MEDICAID

## 2025-02-06 VITALS — WEIGHT: 184.06 LBS | HEIGHT: 67 IN | BODY MASS INDEX: 28.89 KG/M2

## 2025-02-06 VITALS
RESPIRATION RATE: 18 BRPM | DIASTOLIC BLOOD PRESSURE: 58 MMHG | HEIGHT: 67 IN | BODY MASS INDEX: 30.29 KG/M2 | WEIGHT: 193 LBS | OXYGEN SATURATION: 98 % | HEART RATE: 94 BPM | SYSTOLIC BLOOD PRESSURE: 120 MMHG

## 2025-02-06 DIAGNOSIS — M27.0 TORUS PALATINUS: ICD-10-CM

## 2025-02-06 DIAGNOSIS — J31.0 CHRONIC RHINITIS: ICD-10-CM

## 2025-02-06 DIAGNOSIS — K62.5 RECTAL BLEEDING: ICD-10-CM

## 2025-02-06 DIAGNOSIS — R19.4 ENCOUNTER FOR DIAGNOSTIC COLONOSCOPY DUE TO CHANGE IN BOWEL HABITS: Primary | ICD-10-CM

## 2025-02-06 DIAGNOSIS — J32.2 CHRONIC ETHMOIDAL SINUSITIS: Primary | ICD-10-CM

## 2025-02-06 PROCEDURE — 99999 PR PBB SHADOW E&M-EST. PATIENT-LVL III: CPT | Mod: PBBFAC,,, | Performed by: STUDENT IN AN ORGANIZED HEALTH CARE EDUCATION/TRAINING PROGRAM

## 2025-02-06 PROCEDURE — 99215 OFFICE O/P EST HI 40 MIN: CPT | Mod: PBBFAC,27

## 2025-02-06 PROCEDURE — 99204 OFFICE O/P NEW MOD 45 MIN: CPT | Mod: S$PBB,,, | Performed by: STUDENT IN AN ORGANIZED HEALTH CARE EDUCATION/TRAINING PROGRAM

## 2025-02-06 PROCEDURE — 99204 OFFICE O/P NEW MOD 45 MIN: CPT | Mod: S$PBB,,,

## 2025-02-06 PROCEDURE — 99999 PR PBB SHADOW E&M-EST. PATIENT-LVL V: CPT | Mod: PBBFAC,,,

## 2025-02-06 PROCEDURE — 99213 OFFICE O/P EST LOW 20 MIN: CPT | Mod: PBBFAC,27 | Performed by: STUDENT IN AN ORGANIZED HEALTH CARE EDUCATION/TRAINING PROGRAM

## 2025-02-06 RX ORDER — SODIUM CHLORIDE 0.9 % (FLUSH) 0.9 %
10 SYRINGE (ML) INJECTION
Status: CANCELLED | OUTPATIENT
Start: 2025-02-06

## 2025-02-06 RX ORDER — IPRATROPIUM BROMIDE 21 UG/1
2 SPRAY, METERED NASAL 2 TIMES DAILY
Qty: 30 ML | Refills: 0 | Status: SHIPPED | OUTPATIENT
Start: 2025-02-06 | End: 2025-02-06

## 2025-02-06 RX ORDER — SOD SULF/POT CHLORIDE/MAG SULF 1.479 G
12 TABLET ORAL 2 TIMES DAILY
Qty: 24 TABLET | Refills: 0 | Status: ON HOLD | OUTPATIENT
Start: 2025-02-06 | End: 2025-02-21 | Stop reason: HOSPADM

## 2025-02-06 RX ORDER — SODIUM CHLORIDE 9 MG/ML
INJECTION, SOLUTION INTRAVENOUS CONTINUOUS
Status: CANCELLED | OUTPATIENT
Start: 2025-02-06

## 2025-02-06 NOTE — H&P
"[facility] H&P      Consult: Rectal bleeding  Consulting Service: No ref. provider found  Chief Complaint: Pink when wiping        HPI: Pt is a 44 y.o.female who presents for Rectal bleeding. No personal or family history of CRC. No previous colon cancer screening. Denies melena, rectal pain, or unintended weight loss. Denies CP, SOB, abdominal pain, nausea, vomiting, fevers, or chills. Pt reports pink on tissue paper when wiping after bms and loose stools onset about a year ago. Had a hysterectomy recently but still having rectal bleeding.        PMH:   Past Medical History:   Diagnosis Date    COVID-19 06/15/2020    Essential (primary) hypertension     Vaginal bleeding 03/2022    Wrist fracture     14 years old     PSH:   Past Surgical History:   Procedure Laterality Date    HYSTEROSCOPY WITH HYDROTHERMAL ABLATION OF ENDOMETRIUM WITH DILATION AND CURETTAGE N/A 03/21/2022    Procedure: HYSTEROSCOPY, WITH DILATION AND CURETTAGE OF UTERUS AND HYDROTHERMAL ENDOMETRIAL ABLATION;  Surgeon: Tonia Tabor MD;  Location: [facility];  Service: OB/GYN;  Laterality: N/A;  2 0630    LAPAROSCOPIC TOTAL HYSTERECTOMY Bilateral 1/9/2025    Procedure: HYSTERECTOMY, TOTAL, LAPAROSCOPIC;  Surgeon: oTnia Tabor MD;  Location: [facility] OR;  Service: OB/GYN;  Laterality: Bilateral;  1st 0600    TUBAL LIGATION      twice, became pregnant after 1st tubal "clip came off"     Meds: See medication list;  No anticoagulation  ALL: Patient has no known allergies.  FHX: see above   SOC:   Social History     Socioeconomic History    Marital status:    Tobacco Use    Smoking status: Never    Smokeless tobacco: Never   Substance and Sexual Activity    Alcohol use: Yes     Comment: "here and there"    Drug use: Yes     Types: Marijuana     Comment: occasionally    Sexual activity: Yes     Partners: Male     Birth control/protection: See Surgical Hx     ROS: Review of Systems   Constitutional:  Negative for " "chills, diaphoresis, fever, malaise/fatigue and weight loss.   Respiratory:  Negative for cough, hemoptysis and shortness of breath.    Cardiovascular:  Negative for chest pain, palpitations and leg swelling.   Gastrointestinal:  Positive for blood in stool and diarrhea. Negative for abdominal pain, constipation, heartburn, melena, nausea and vomiting.   Musculoskeletal:  Positive for myalgias.   All other systems reviewed and are negative.          Physical Exam:  BP (!) 120/58 (BP Location: Left arm, Patient Position: Sitting)   Pulse 94   Resp 18   Ht 5' 7" (1.702 m)   Wt 87.5 kg (193 lb 0.2 oz)   LMP 12/23/2024   SpO2 98%   BMI 30.23 kg/m²   Physical Exam  Vitals reviewed.   Constitutional:       General: She is not in acute distress.     Appearance: Normal appearance. She is obese. She is not ill-appearing, toxic-appearing or diaphoretic.   HENT:      Head: Normocephalic and atraumatic.      Mouth/Throat:      Mouth: Mucous membranes are moist.   Eyes:      Conjunctiva/sclera: Conjunctivae normal.   Cardiovascular:      Rate and Rhythm: Normal rate and regular rhythm.   Pulmonary:      Effort: Pulmonary effort is normal. No respiratory distress.   Abdominal:      General: There is no distension.      Palpations: Abdomen is soft.      Tenderness: There is no abdominal tenderness. There is no guarding or rebound.   Musculoskeletal:         General: Normal range of motion.      Cervical back: Normal range of motion.   Skin:     General: Skin is warm.      Capillary Refill: Capillary refill takes less than 2 seconds.   Neurological:      Mental Status: She is alert and oriented to person, place, and time. Mental status is at baseline.   Psychiatric:         Mood and Affect: Mood normal.         Behavior: Behavior normal.         Thought Content: Thought content normal.         Judgment: Judgment normal.               A/P: Pt is a 44 y.o.female who presents for Rectal bleeding  --To Endo on 2/21 for " colonoscopy  --Procedure explained in detail to patient; including risks of but not limited to bleeding, infection, damage to surrounding structures, and perforation- patient voiced understanding  --Consent obtained and signed  --Pre-op orders placed  --Bowel prep given - Sutab  --Instructions reviewed and given to patient   --CLD day before procedure  --Recent labs and EKG        Austin Tello NP  General Surgery   [phone]

## 2025-02-06 NOTE — H&P (VIEW-ONLY)
"[facility] H&P      Consult: Rectal bleeding  Consulting Service: No ref. provider found  Chief Complaint: Pink when wiping        HPI: Pt is a 44 y.o.female who presents for Rectal bleeding. No personal or family history of CRC. No previous colon cancer screening. Denies melena, rectal pain, or unintended weight loss. Denies CP, SOB, abdominal pain, nausea, vomiting, fevers, or chills. Pt reports pink on tissue paper when wiping after bms and loose stools onset about a year ago. Had a hysterectomy recently but still having rectal bleeding.        PMH:   Past Medical History:   Diagnosis Date    COVID-19 06/15/2020    Essential (primary) hypertension     Vaginal bleeding 03/2022    Wrist fracture     14 years old     PSH:   Past Surgical History:   Procedure Laterality Date    HYSTEROSCOPY WITH HYDROTHERMAL ABLATION OF ENDOMETRIUM WITH DILATION AND CURETTAGE N/A 03/21/2022    Procedure: HYSTEROSCOPY, WITH DILATION AND CURETTAGE OF UTERUS AND HYDROTHERMAL ENDOMETRIAL ABLATION;  Surgeon: Tonia Tabor MD;  Location: [facility] OR;  Service: OB/GYN;  Laterality: N/A;  2 0630    LAPAROSCOPIC TOTAL HYSTERECTOMY Bilateral 1/9/2025    Procedure: HYSTERECTOMY, TOTAL, LAPAROSCOPIC;  Surgeon: Tonia Tabor MD;  Location: [facility] OR;  Service: OB/GYN;  Laterality: Bilateral;  1st 0600    TUBAL LIGATION      twice, became pregnant after 1st tubal "clip came off"     Meds: See medication list;  No anticoagulation  ALL: Patient has no known allergies.  FHX: see above   SOC:   Social History     Socioeconomic History    Marital status:    Tobacco Use    Smoking status: Never    Smokeless tobacco: Never   Substance and Sexual Activity    Alcohol use: Yes     Comment: "here and there"    Drug use: Yes     Types: Marijuana     Comment: occasionally    Sexual activity: Yes     Partners: Male     Birth control/protection: See Surgical Hx     ROS: Review of Systems   Constitutional:  Negative for " "chills, diaphoresis, fever, malaise/fatigue and weight loss.   Respiratory:  Negative for cough, hemoptysis and shortness of breath.    Cardiovascular:  Negative for chest pain, palpitations and leg swelling.   Gastrointestinal:  Positive for blood in stool and diarrhea. Negative for abdominal pain, constipation, heartburn, melena, nausea and vomiting.   Musculoskeletal:  Positive for myalgias.   All other systems reviewed and are negative.          Physical Exam:  BP (!) 120/58 (BP Location: Left arm, Patient Position: Sitting)   Pulse 94   Resp 18   Ht 5' 7" (1.702 m)   Wt 87.5 kg (193 lb 0.2 oz)   LMP 12/23/2024   SpO2 98%   BMI 30.23 kg/m²   Physical Exam  Vitals reviewed.   Constitutional:       General: She is not in acute distress.     Appearance: Normal appearance. She is obese. She is not ill-appearing, toxic-appearing or diaphoretic.   HENT:      Head: Normocephalic and atraumatic.      Mouth/Throat:      Mouth: Mucous membranes are moist.   Eyes:      Conjunctiva/sclera: Conjunctivae normal.   Cardiovascular:      Rate and Rhythm: Normal rate and regular rhythm.   Pulmonary:      Effort: Pulmonary effort is normal. No respiratory distress.   Abdominal:      General: There is no distension.      Palpations: Abdomen is soft.      Tenderness: There is no abdominal tenderness. There is no guarding or rebound.   Musculoskeletal:         General: Normal range of motion.      Cervical back: Normal range of motion.   Skin:     General: Skin is warm.      Capillary Refill: Capillary refill takes less than 2 seconds.   Neurological:      Mental Status: She is alert and oriented to person, place, and time. Mental status is at baseline.   Psychiatric:         Mood and Affect: Mood normal.         Behavior: Behavior normal.         Thought Content: Thought content normal.         Judgment: Judgment normal.               A/P: Pt is a 44 y.o.female who presents for Rectal bleeding  --To Endo on 2/21 for " colonoscopy  --Procedure explained in detail to patient; including risks of but not limited to bleeding, infection, damage to surrounding structures, and perforation- patient voiced understanding  --Consent obtained and signed  --Pre-op orders placed  --Bowel prep given - Sutab  --Instructions reviewed and given to patient   --CLD day before procedure  --Recent labs and EKG        Austin Tello NP  General Surgery   [phone]

## 2025-02-18 ENCOUNTER — OFFICE VISIT (OUTPATIENT)
Dept: OBSTETRICS AND GYNECOLOGY | Facility: CLINIC | Age: 45
End: 2025-02-18
Payer: MEDICAID

## 2025-02-18 VITALS — HEIGHT: 67 IN | WEIGHT: 199 LBS | BODY MASS INDEX: 31.23 KG/M2

## 2025-02-18 DIAGNOSIS — Z90.710 S/P LAPAROSCOPIC HYSTERECTOMY: Primary | ICD-10-CM

## 2025-02-18 PROCEDURE — 99212 OFFICE O/P EST SF 10 MIN: CPT | Mod: PBBFAC | Performed by: OBSTETRICS & GYNECOLOGY

## 2025-02-18 PROCEDURE — 99024 POSTOP FOLLOW-UP VISIT: CPT | Mod: ,,, | Performed by: OBSTETRICS & GYNECOLOGY

## 2025-02-18 PROCEDURE — 99999 PR PBB SHADOW E&M-EST. PATIENT-LVL II: CPT | Mod: PBBFAC,,, | Performed by: OBSTETRICS & GYNECOLOGY

## 2025-02-18 NOTE — PROGRESS NOTES
Subjective:    Patient ID: Cliff Marroquin is a 44 y.o. y.o. female    Chief Complaint:   Chief Complaint   Patient presents with    Post-op Evaluation       History of Present Illness:  Cliff presents today for follow up of [facility] 6 weeks ago.  Patient states she is feeling well and is anxious to return to work full-time.  No vaginal bleeding or lower abdominal cramping noted      Review of Systems   Constitutional:  Negative for chills and fever.   Respiratory:  Negative for shortness of breath.    Cardiovascular:  Negative for chest pain.   Gastrointestinal:  Negative for constipation.   Genitourinary:  Negative for dysuria, pelvic pain and vaginal bleeding.   Neurological:  Negative for headaches.         Objective:    Vital Signs:  There were no vitals filed for this visit.  Wt Readings from Last 1 Encounters:   02/21/25 90.3 kg (199 lb)     Body mass index is 31.17 kg/m².    Physical Exam:  General:  alert, no distress   Abdomen:  Soft, non-tender. Bowel sounds normal. No masses,  no organomegaly   Pelvis: External genitalia: normal general appearance  Urinary system: urethral meatus normal, bladder nontender  Vaginal: normal mucosa without prolapse or lesions  Cervix: removed surgically, cuff healing well  Uterus: removed surgically  Adnexa: normal bimanual exam     Return to work full duty note given.  All questions answered          Assessment:      1. S/P laparoscopic hysterectomy          Plan:      S/P laparoscopic hysterectomy    RTC for annual       Tonia Tabor MD, FACOG   02/18/2025 3:31 PM

## 2025-02-19 ENCOUNTER — ANESTHESIA EVENT (OUTPATIENT)
Dept: ENDOSCOPY | Facility: HOSPITAL | Age: 45
End: 2025-02-19
Payer: MEDICAID

## 2025-02-19 NOTE — ANESTHESIA PREPROCEDURE EVALUATION
02/19/2025  Cliff Marroquin is a 44 y.o., female.      Pre-op Assessment    I have reviewed the Patient Summary Reports.    I have reviewed the NPO Status.   I have reviewed the Medications.     Review of Systems  Anesthesia Hx:  No problems with previous Anesthesia             Denies Family Hx of Anesthesia complications.    Denies Personal Hx of Anesthesia complications.                    Social:  Non-Smoker       Cardiovascular:     Hypertension, well controlled              ECG has been reviewed.                            Pulmonary:  Pulmonary Normal                       Renal/:  Renal/ Normal                 Hepatic/GI:  Hepatic/GI Normal                    Neurological:  Neurology Normal                                      Endocrine:  Endocrine Normal              Lab Results   Component Value Date    WBC 9.44 12/30/2024    HGB 12.4 12/30/2024    HCT 36.0 (L) 12/30/2024    MCV 88 12/30/2024     12/30/2024      CMP  Sodium   Date Value Ref Range Status   12/30/2024 141 136 - 145 mmol/L Final     Potassium   Date Value Ref Range Status   12/30/2024 3.5 3.5 - 5.1 mmol/L Final     Chloride   Date Value Ref Range Status   12/30/2024 107 95 - 110 mmol/L Final     CO2   Date Value Ref Range Status   12/30/2024 31 (H) 23 - 29 mmol/L Final     Glucose   Date Value Ref Range Status   12/30/2024 85 70 - 110 mg/dL Final     BUN   Date Value Ref Range Status   12/30/2024 10 6 - 20 mg/dL Final     Creatinine   Date Value Ref Range Status   12/30/2024 1.0 0.5 - 1.4 mg/dL Final     Calcium   Date Value Ref Range Status   12/30/2024 8.5 (L) 8.7 - 10.5 mg/dL Final     Total Protein   Date Value Ref Range Status   12/30/2024 6.9 6.0 - 8.4 g/dL Final     Albumin   Date Value Ref Range Status   12/30/2024 3.4 (L) 3.5 - 5.2 g/dL Final     Total Bilirubin   Date Value Ref Range Status   12/30/2024 0.1 0.1 -  1.0 mg/dL Final     Comment:     For infants and newborns, interpretation of results should be based  on gestational age, weight and in agreement with clinical  observations.    Premature Infant recommended reference ranges:  Up to 24 hours.............<8.0 mg/dL  Up to 48 hours............<12.0 mg/dL  3-5 days..................<15.0 mg/dL  6-29 days.................<15.0 mg/dL    For patients on Eltrombopag therapy, use of Dimension [place] TBIL is   not   recommended.       Alkaline Phosphatase   Date Value Ref Range Status   12/30/2024 63 55 - 135 U/L Final     AST   Date Value Ref Range Status   12/30/2024 17 10 - 40 U/L Final     ALT   Date Value Ref Range Status   12/30/2024 15 10 - 44 U/L Final     Anion Gap   Date Value Ref Range Status   12/30/2024 3 3 - 11 mmol/L Final     eGFR   Date Value Ref Range Status   12/30/2024 >60.0 >60 mL/min/1.73 m^2 Final        Physical Exam  General: Well nourished    Airway:  Mallampati: III / II  Mouth Opening: Normal  TM Distance: Normal  Tongue: Normal  Neck ROM: Normal ROM    Dental:  Intact    Chest/Lungs:  Clear to auscultation    Heart:  Rate: Normal  Rhythm: Regular Rhythm  Sounds: Normal        Anesthesia Plan  Type of Anesthesia, risks & benefits discussed:    Anesthesia Type: MAC  Intra-op Monitoring Plan: Standard ASA Monitors  Post Op Pain Control Plan: multimodal analgesia  Induction:  IV  Airway Plan: Direct  Informed Consent: Informed consent signed with the Patient and all parties understand the risks and agree with anesthesia plan.  All questions answered.   ASA Score: 2  Day of Surgery Review of History & Physical: I have interviewed and examined the patient. I have reviewed the patient's H&P dated: There are no significant changes.     Ready For Surgery From Anesthesia Perspective.     .

## 2025-02-21 ENCOUNTER — HOSPITAL ENCOUNTER (OUTPATIENT)
Facility: HOSPITAL | Age: 45
Discharge: HOME | End: 2025-02-21
Attending: STUDENT IN AN ORGANIZED HEALTH CARE EDUCATION/TRAINING PROGRAM | Admitting: STUDENT IN AN ORGANIZED HEALTH CARE EDUCATION/TRAINING PROGRAM
Payer: MEDICAID

## 2025-02-21 ENCOUNTER — ANESTHESIA (OUTPATIENT)
Dept: ENDOSCOPY | Facility: HOSPITAL | Age: 45
End: 2025-02-21
Payer: MEDICAID

## 2025-02-21 DIAGNOSIS — Z12.11 SCREEN FOR COLON CANCER: Primary | ICD-10-CM

## 2025-02-21 DIAGNOSIS — K62.5 RECTAL BLEEDING: ICD-10-CM

## 2025-02-21 DIAGNOSIS — R19.4 ENCOUNTER FOR DIAGNOSTIC COLONOSCOPY DUE TO CHANGE IN BOWEL HABITS: ICD-10-CM

## 2025-02-21 PROCEDURE — 37000009 HC ANESTHESIA EA ADD 15 MINS: Performed by: STUDENT IN AN ORGANIZED HEALTH CARE EDUCATION/TRAINING PROGRAM

## 2025-02-21 PROCEDURE — 37000008 HC ANESTHESIA 1ST 15 MINUTES: Performed by: STUDENT IN AN ORGANIZED HEALTH CARE EDUCATION/TRAINING PROGRAM

## 2025-02-21 PROCEDURE — 63600175 PHARM REV CODE 636 W HCPCS: Performed by: NURSE ANESTHETIST, CERTIFIED REGISTERED

## 2025-02-21 PROCEDURE — 45378 DIAGNOSTIC COLONOSCOPY: CPT | Performed by: STUDENT IN AN ORGANIZED HEALTH CARE EDUCATION/TRAINING PROGRAM

## 2025-02-21 RX ORDER — HYDROCORTISONE 25 MG/G
CREAM TOPICAL 2 TIMES DAILY
Qty: 28 G | Refills: 0 | Status: SHIPPED | OUTPATIENT
Start: 2025-02-21

## 2025-02-21 RX ORDER — PROPOFOL 10 MG/ML
INJECTION, EMULSION INTRAVENOUS
Status: DISCONTINUED | OUTPATIENT
Start: 2025-02-21 | End: 2025-02-21

## 2025-02-21 RX ORDER — SODIUM CHLORIDE 0.9 % (FLUSH) 0.9 %
10 SYRINGE (ML) INJECTION
Status: DISCONTINUED | OUTPATIENT
Start: 2025-02-21 | End: 2025-02-21 | Stop reason: HOSPADM

## 2025-02-21 RX ORDER — SODIUM CHLORIDE 9 MG/ML
INJECTION, SOLUTION INTRAVENOUS CONTINUOUS
Status: DISCONTINUED | OUTPATIENT
Start: 2025-02-21 | End: 2025-02-21 | Stop reason: HOSPADM

## 2025-02-21 RX ADMIN — PROPOFOL 40 MG: 10 INJECTION, EMULSION INTRAVENOUS at 08:02

## 2025-02-21 RX ADMIN — PROPOFOL 100 MG: 10 INJECTION, EMULSION INTRAVENOUS at 08:02

## 2025-02-21 NOTE — LETTER
February 21, 2025    Cliff Marroquin  [address] [place] [id number]                   [facility] [address]  Phone: [phone]  Fax: [phone]   Dear Ms. Cliff Marroquin:    Thank you for choosing [facility] for your recent colonoscopy!  We strive to provide our patients with the highest quality preventative care because your health is worth it!    Your recent colonoscopy with Dr. Alvarez was normal.     Since you do not have a family history of colon or rectal cancer, it is recommended that you undergo another colonoscopy in 10 years.    I would also recommend colon cancer screening for any first-degree relatives (brothers, sisters, children, and parents) beginning at age 45. Make your family members aware of these results so that they can discuss screening with their physician    I would suggest you consider changing any health habits that might increase your chance of forming more precancerous polyps and thus colorectal cancer. You may lower your chance of developing future polyps and colorectal cancer by adopting healthy habits such as not smoking, maintaining a healthy body weight, being physically active, limiting red and processed meat (such as beef, cold cuts, ellis, and hot dogs),minimizing alcohol intake (or avoiding alcohol altogether), and eating a diet with a lot of fruits and vegetables.    Please feel free to contact me at [phone] with any questions or concerns.    Thank you for allowing me to participate in your care!    Sincerely,     Janet Alvarez MD

## 2025-02-21 NOTE — OP NOTE
[facility] - OR Peri Services  General Surgery Department  Operative Note    SUMMARY     Date of Procedure: 2/21/2025    Procedure: Procedure(s) (LRB):  COLONOSCOPY: 49408 (CPT®)       Surgeon(s) and Role:  Janet Alvarez MD     Pre-Operative Diagnosis:  Screen for colon cancer  Rectal bleeding    Post-Operative Diagnosis:   Rectal bleeding  Internal hemorrhoids      Anesthesia: Local MAC      Findings:  -No masses or polypoid lesions seen.    -There was normal mucosa with normal submucosal venous pattern.    -No vascular lesions were identified.    -No major diverticular disease was encountered.   -Grade 1 internal hemorrhoids without external component    Indications for the Procedure:  43yo female with no family history of colorectal cancer who presents for diagnostic colonoscopy for rectal bleeding .    Operative Conduct in Detail:   The risks, benefits, and alternatives were thoroughly discussed with the patient. Despite the risks, the patient wished to proceed. Informed consent was obtained and it will scanned to the electronic chart.      The patient was placed on left lateral decubitus. After achieving moderate sedation, a digital rectal examination was performed; subsequently, a standard colonoscope was introduced through the anus and advanced without difficulty up to the cecum where terminal ileum and appendiceal orifice were visualized The scope was withdrawn slowly while the mucosa was carefully examined. The following findings were seen:    Bowel Preparation: good  Rectal exam was normal with good rectal tone and no masses or blood detected in the rectal vault.   No masses or polypoid lesions seen.    There was normal mucosa with normal submucosal venous pattern.    No vascular lesions were identified.    No major diverticular disease was encountered.   Grade 1 internal hemorrhoids without external component    Withdrawal time >6 minutes (if no biopsies/polypectomies  obtained)      Complications: No    Estimated Blood Loss (EBL): None             Specimens:   Specimens (From admission, onward)      None                   Condition: Good    Disposition: PACU - hemodynamically stable.    Recommendation:    Repeat colonoscopy in 10 years  Anusol BID for 14 days   Follow up in [facility] clinic in two weeks     Janet Alvarez MD  General Surgery   [phone]

## 2025-02-21 NOTE — DISCHARGE SUMMARY
[facility] - Endoscopy  Discharge Note  Short Stay    Procedure(s) (LRB):  COLONOSCOPY (N/A)      OUTCOME: Patient tolerated treatment/procedure well without complication and is now ready for discharge.    DISPOSITION: Home or Self Care    FINAL DIAGNOSIS:  Screen for colon cancer    FOLLOWUP: In clinic    DISCHARGE INSTRUCTIONS:    Discharge Procedure Orders   Diet Adult Regular     No driving until:   Order Comments: 24 hours after procedure     Notify your health care provider if you experience any of the following:  temperature >100.4     Notify your health care provider if you experience any of the following:  persistent nausea and vomiting or diarrhea     Notify your health care provider if you experience any of the following:  severe uncontrolled pain     Activity as tolerated        TIME SPENT ON DISCHARGE: 5 minutes

## 2025-02-21 NOTE — PLAN OF CARE
Patient prepared for procedure. Mother at the bedside. No complaints or concerns voiced at present time. Denies any pain. Prep completed without difficulty. 20g IV inserted into right hand, saline locked. Bed locked and in lowest position to the floor. Call bell placed in reach for any needs.

## 2025-02-21 NOTE — DISCHARGE INSTRUCTIONS
FOLLOW UP WITH DR DUONG AS INSTRUCTED.  DO NOT DRINK ALCOHOL AND NO DRIVING FOR THE NEXT 24 HOURS  REST TODAY --- RESUME ACTIVITY AS TOLERATED TOMORROW  RESUME CURRENT DIET  RESUME HOME MEDICATIONS  CALL DR DUONG'S OFFICE FOR ANY QUESTIONS OR CONCERNS.  REPORT TO THE ER IF URGENT.    THANK YOU FOR CHOOSING [facility]!

## 2025-02-21 NOTE — TRANSFER OF CARE
Anesthesia Transfer of Care Note    Patient: Cliff Marroquin    Procedure(s) Performed: Procedure(s) (LRB):  COLONOSCOPY (N/A)    Patient location: OPS    Anesthesia Type: MAC    Transport from OR: Transported from OR on room air with adequate spontaneous ventilation    Post pain: adequate analgesia    Post assessment: no apparent anesthetic complications    Post vital signs: stable    Level of consciousness: awake, alert and oriented    Nausea/Vomiting: no nausea/vomiting    Complications: none    Transfer of care protocol was followed      Last vitals:     /64  P 90  R 16  O2 Sat 98%

## 2025-02-21 NOTE — INTERVAL H&P NOTE
Patient seen and examined.  No interval change since the below obtained H&P  Informed consent verified    NPO since midnight  Prep completed - Sutab  No anticoagulation  All questions and concerns addressed  To Endo for diag colonoscopy for rectal bleeding    Janet Alvarez MD  General Surgery   [phone]

## 2025-02-24 NOTE — ANESTHESIA POSTPROCEDURE EVALUATION
Anesthesia Post Evaluation    Patient: Cliff Marroquin    Procedure(s) Performed: Procedure(s) (LRB):  COLONOSCOPY (N/A)    Final Anesthesia Type: MAC      Patient location during evaluation: OPS  Patient participation: Yes- Able to Participate  Level of consciousness: awake and alert and oriented  Post-procedure vital signs: reviewed and stable  Pain management: adequate  Airway patency: patent    PONV status at discharge: No PONV  Anesthetic complications: no      Cardiovascular status: blood pressure returned to baseline  Respiratory status: unassisted, room air and spontaneous ventilation  Hydration status: euvolemic  Follow-up not needed.              Vitals Value Taken Time   /57 02/21/25 08:58   Temp 36.8 °C (98.3 °F) 02/21/25 08:58   Pulse 89 02/21/25 08:58   Resp 18 02/21/25 08:58   SpO2 97 % 02/21/25 08:58         No case tracking events are documented in the log.      Pain/Sunitha Score: No data recorded

## 2025-02-26 VITALS
OXYGEN SATURATION: 97 % | HEIGHT: 67 IN | HEART RATE: 89 BPM | DIASTOLIC BLOOD PRESSURE: 57 MMHG | RESPIRATION RATE: 18 BRPM | SYSTOLIC BLOOD PRESSURE: 104 MMHG | WEIGHT: 199 LBS | TEMPERATURE: 98 F | BODY MASS INDEX: 31.23 KG/M2

## 2025-03-14 ENCOUNTER — TELEPHONE (OUTPATIENT)
Dept: OTOLARYNGOLOGY | Facility: CLINIC | Age: 45
End: 2025-03-14
Payer: MEDICAID

## 2025-03-14 RX ORDER — IPRATROPIUM BROMIDE 21 UG/1
2 SPRAY, METERED NASAL
COMMUNITY
End: 2025-03-14 | Stop reason: SDUPTHER

## 2025-03-17 RX ORDER — IPRATROPIUM BROMIDE 21 UG/1
2 SPRAY, METERED NASAL EVERY 12 HOURS
Qty: 30 ML | Refills: 1 | Status: SHIPPED | OUTPATIENT
Start: 2025-03-17

## 2025-08-22 ENCOUNTER — HOSPITAL ENCOUNTER (EMERGENCY)
Facility: HOSPITAL | Age: 45
Discharge: HOME | End: 2025-08-22
Attending: EMERGENCY MEDICINE
Payer: MEDICAID

## 2025-08-22 VITALS
BODY MASS INDEX: 30.45 KG/M2 | RESPIRATION RATE: 18 BRPM | HEART RATE: 78 BPM | OXYGEN SATURATION: 100 % | SYSTOLIC BLOOD PRESSURE: 130 MMHG | TEMPERATURE: 98 F | HEIGHT: 67 IN | DIASTOLIC BLOOD PRESSURE: 93 MMHG | WEIGHT: 194 LBS

## 2025-08-22 DIAGNOSIS — J34.9 SINUS DISEASE: ICD-10-CM

## 2025-08-22 DIAGNOSIS — H66.001 NON-RECURRENT ACUTE SUPPURATIVE OTITIS MEDIA OF RIGHT EAR WITHOUT SPONTANEOUS RUPTURE OF TYMPANIC MEMBRANE: ICD-10-CM

## 2025-08-22 DIAGNOSIS — K08.89 PAIN, DENTAL: Primary | ICD-10-CM

## 2025-08-22 PROCEDURE — 99284 EMERGENCY DEPT VISIT MOD MDM: CPT | Mod: 25

## 2025-08-22 PROCEDURE — 25000003 PHARM REV CODE 250

## 2025-08-22 RX ORDER — AMOXICILLIN 875 MG/1
875 TABLET, COATED ORAL 2 TIMES DAILY
Qty: 14 TABLET | Refills: 0 | Status: SHIPPED | OUTPATIENT
Start: 2025-08-22

## 2025-08-22 RX ORDER — FLUTICASONE PROPIONATE 50 MCG
1 SPRAY, SUSPENSION (ML) NASAL 2 TIMES DAILY PRN
Qty: 15 G | Refills: 1 | Status: SHIPPED | OUTPATIENT
Start: 2025-08-22 | End: 2025-09-21

## 2025-08-22 RX ORDER — PREDNISONE 20 MG/1
40 TABLET ORAL DAILY
Qty: 10 TABLET | Refills: 0 | Status: SHIPPED | OUTPATIENT
Start: 2025-08-22 | End: 2025-08-27

## 2025-08-22 RX ORDER — HYDROCODONE BITARTRATE AND ACETAMINOPHEN 5; 325 MG/1; MG/1
1 TABLET ORAL
Refills: 0 | Status: COMPLETED | OUTPATIENT
Start: 2025-08-22 | End: 2025-08-22

## 2025-08-22 RX ORDER — OXYCODONE AND ACETAMINOPHEN 5; 325 MG/1; MG/1
1 TABLET ORAL EVERY 8 HOURS PRN
Qty: 9 TABLET | Refills: 0 | Status: SHIPPED | OUTPATIENT
Start: 2025-08-22

## 2025-08-22 RX ADMIN — HYDROCODONE BITARTRATE AND ACETAMINOPHEN 1 TABLET: 5; 325 TABLET ORAL at 10:08

## (undated) DEVICE — SOL NORMAL USPCA 0.9%

## (undated) DEVICE — ENDO GRASPER ETHICON 5DSG

## (undated) DEVICE — DRAPE UNDERBUTTOCKS PCH STRL

## (undated) DEVICE — ELECTRODE REM PLYHSV RETURN 9

## (undated) DEVICE — LINER SUCTION 1500CC

## (undated) DEVICE — TRAY VAG PREP

## (undated) DEVICE — CLOSURE SKIN STERI STRIP 1/2X4

## (undated) DEVICE — LABEL BARKLEY 9/16X1-7/8IN

## (undated) DEVICE — HOOK DISSECTING 5MM

## (undated) DEVICE — APPLICATOR CHLORAPREP ORN 26ML

## (undated) DEVICE — TROCAR ENDOPATH XCEL 5X100MM

## (undated) DEVICE — KIT VIA CUSTOM PROCEDURE

## (undated) DEVICE — SPONGE DRY VIA GREEN

## (undated) DEVICE — ADHESIVE MASTISOL VIAL 48/BX

## (undated) DEVICE — JELLY LUBRICANT STERILE 4 OZ

## (undated) DEVICE — LINER SUCTION CANNISTER REGUGA

## (undated) DEVICE — SCISSOR 5MMX35CM DIRECT DRIVE

## (undated) DEVICE — COVER OVERHEAD SURG LT BLUE

## (undated) DEVICE — TIP RUMI GREEN DISPOSABLE6.7MM

## (undated) DEVICE — RELOAD V-LOC 180 0 8IN/20CM

## (undated) DEVICE — DRESSING PRIMAPORE 4X3 1/8IN

## (undated) DEVICE — OCCLUDER COLPO-PNEUMO STERILE

## (undated) DEVICE — DRESSING TELFA N ADH 3X8

## (undated) DEVICE — KIT BIOGUARD AIR WTR SUC VALVE

## (undated) DEVICE — LINER GLOVE POWDERFREE SZ 6.5

## (undated) DEVICE — GOWN NONREINF SET-IN SLV XL

## (undated) DEVICE — CANNULA SSOFT C02 MALE 14FT

## (undated) DEVICE — TRAY CATH 1-LYR URIMTR 16FR

## (undated) DEVICE — PAD PREP 50/CA

## (undated) DEVICE — IRRIGATOR HYDRO-SURG PLUS 5MM

## (undated) DEVICE — SPONGE X-RAY LAP DETCT 18X18IN

## (undated) DEVICE — CONNECTOR WATERJET ENDO

## (undated) DEVICE — SOL IRRI STRL WATER 1000ML

## (undated) DEVICE — STRAP KNEE & BODY DISP 4X34IN

## (undated) DEVICE — SPONGE COTTON TRAY 4X4IN

## (undated) DEVICE — ENDOSTITCH INSTRUMENT

## (undated) DEVICE — DEVICE ABLATION NOVASURE DISP

## (undated) DEVICE — TOWEL OR STER DISP BLUE 17X27

## (undated) DEVICE — PAD PREP CUFFED NS 24X48IN

## (undated) DEVICE — SET CYSTO IRR DRP CHMBR 84IN

## (undated) DEVICE — BLANKET WARMING UPPER BODY

## (undated) DEVICE — SYS SEE SHARP SCP ANTIFG LNG

## (undated) DEVICE — ELECTRODE MEDI-TRACE 855 FOAM

## (undated) DEVICE — DRESSING PRIMAPORE IV 2X3IN

## (undated) DEVICE — PAD SANITARY OB STERILE

## (undated) DEVICE — TUBING IRRIGATION W/ AIR

## (undated) DEVICE — UNDERPAD DELUXE FLUFF 30X30IN

## (undated) DEVICE — SUT VICRYL ANTIMICRO VIL BR

## (undated) DEVICE — SEALER LIGASURE L-HOOK 37CM

## (undated) DEVICE — SYR 10CC LUER LOCK

## (undated) DEVICE — ADAPTER DISP HAND SWITCH

## (undated) DEVICE — COVER WARMING BODY UPPER 78X21

## (undated) DEVICE — Device

## (undated) DEVICE — PAD UNDERPAD 30X30

## (undated) DEVICE — DRAPE LAVH SURG 109X109X100IN

## (undated) DEVICE — SCALPEL SZ 11 RETRACTABLE

## (undated) DEVICE — HEMOSTAT SURGICEL 2X14IN

## (undated) DEVICE — CATH 16FR URETHRL RED RUB

## (undated) DEVICE — TUBE SUC UNIVERSAL .25XIN 6FT

## (undated) DEVICE — PACK SURG LITHOTOMY 3 SIRUS

## (undated) DEVICE — GAUZE SPONGE XRAY 4X4

## (undated) DEVICE — SET PNEUMOCLEAR HEAT HUM SE HF

## (undated) DEVICE — JELLY SURGILUBE CARBMR FRE 2OZ

## (undated) DEVICE — NDL INSUF ULTRA VERESS 120MM

## (undated) DEVICE — SUT MONOCYRL 4-0 PS2 UND

## (undated) DEVICE — SYR 50CC LL

## (undated) DEVICE — GOWN SURGICAL BRTHBL XL XLNG

## (undated) DEVICE — TROCAR ENDOPATH XCEL 11MM 10CM